# Patient Record
Sex: MALE | Race: WHITE
[De-identification: names, ages, dates, MRNs, and addresses within clinical notes are randomized per-mention and may not be internally consistent; named-entity substitution may affect disease eponyms.]

---

## 2017-09-10 ENCOUNTER — HOSPITAL ENCOUNTER (OUTPATIENT)
Dept: HOSPITAL 25 - ED | Age: 77
Setting detail: OBSERVATION
LOS: 1 days | Discharge: HOME | End: 2017-09-11
Attending: INTERNAL MEDICINE | Admitting: HOSPITALIST
Payer: MEDICARE

## 2017-09-10 DIAGNOSIS — K40.90: ICD-10-CM

## 2017-09-10 DIAGNOSIS — K80.20: ICD-10-CM

## 2017-09-10 DIAGNOSIS — I31.3: ICD-10-CM

## 2017-09-10 DIAGNOSIS — R07.89: Primary | ICD-10-CM

## 2017-09-10 DIAGNOSIS — E78.5: ICD-10-CM

## 2017-09-10 DIAGNOSIS — R06.02: ICD-10-CM

## 2017-09-10 DIAGNOSIS — I48.2: ICD-10-CM

## 2017-09-10 DIAGNOSIS — I34.0: ICD-10-CM

## 2017-09-10 DIAGNOSIS — I27.2: ICD-10-CM

## 2017-09-10 DIAGNOSIS — Z79.01: ICD-10-CM

## 2017-09-10 DIAGNOSIS — I25.2: ICD-10-CM

## 2017-09-10 DIAGNOSIS — I35.0: ICD-10-CM

## 2017-09-10 DIAGNOSIS — R00.1: ICD-10-CM

## 2017-09-10 DIAGNOSIS — I10: ICD-10-CM

## 2017-09-10 LAB
HCT VFR BLD AUTO: 36 % (ref 42–52)
HGB BLD-MCNC: 12.2 G/DL (ref 14–18)
MCH RBC QN AUTO: 30 PG (ref 27–31)
MCHC RBC AUTO-ENTMCNC: 34 G/DL (ref 31–36)
MCV RBC AUTO: 88 FL (ref 80–94)
RBC # BLD AUTO: 4.03 10^6/UL (ref 4–5.4)
WBC # BLD AUTO: 7.2 10^3/UL (ref 3.5–10.8)

## 2017-09-10 PROCEDURE — G0378 HOSPITAL OBSERVATION PER HR: HCPCS

## 2017-09-10 PROCEDURE — 71275 CT ANGIOGRAPHY CHEST: CPT

## 2017-09-10 PROCEDURE — 93306 TTE W/DOPPLER COMPLETE: CPT

## 2017-09-10 PROCEDURE — 85025 COMPLETE CBC W/AUTO DIFF WBC: CPT

## 2017-09-10 PROCEDURE — 86140 C-REACTIVE PROTEIN: CPT

## 2017-09-10 PROCEDURE — 85652 RBC SED RATE AUTOMATED: CPT

## 2017-09-10 PROCEDURE — 83605 ASSAY OF LACTIC ACID: CPT

## 2017-09-10 PROCEDURE — 78452 HT MUSCLE IMAGE SPECT MULT: CPT

## 2017-09-10 PROCEDURE — 99285 EMERGENCY DEPT VISIT HI MDM: CPT

## 2017-09-10 PROCEDURE — 71010: CPT

## 2017-09-10 PROCEDURE — 93017 CV STRESS TEST TRACING ONLY: CPT

## 2017-09-10 PROCEDURE — 84484 ASSAY OF TROPONIN QUANT: CPT

## 2017-09-10 PROCEDURE — 74174 CTA ABD&PLVS W/CONTRAST: CPT

## 2017-09-10 PROCEDURE — 87040 BLOOD CULTURE FOR BACTERIA: CPT

## 2017-09-10 PROCEDURE — 36415 COLL VENOUS BLD VENIPUNCTURE: CPT

## 2017-09-10 PROCEDURE — 85610 PROTHROMBIN TIME: CPT

## 2017-09-10 PROCEDURE — 80053 COMPREHEN METABOLIC PANEL: CPT

## 2017-09-10 PROCEDURE — A9502 TC99M TETROFOSMIN: HCPCS

## 2017-09-10 PROCEDURE — 93005 ELECTROCARDIOGRAM TRACING: CPT

## 2017-09-11 VITALS — DIASTOLIC BLOOD PRESSURE: 59 MMHG | SYSTOLIC BLOOD PRESSURE: 142 MMHG

## 2017-09-11 LAB
ALBUMIN SERPL BCG-MCNC: 4.1 G/DL (ref 3.2–5.2)
ALP SERPL-CCNC: 52 U/L (ref 34–104)
ALT SERPL W P-5'-P-CCNC: 33 U/L (ref 7–52)
ANION GAP SERPL CALC-SCNC: 5 MMOL/L (ref 2–11)
AST SERPL-CCNC: 44 U/L (ref 13–39)
BUN SERPL-MCNC: 17 MG/DL (ref 6–24)
BUN/CREAT SERPL: 16 (ref 8–20)
CALCIUM SERPL-MCNC: 9.1 MG/DL (ref 8.6–10.3)
CHLORIDE SERPL-SCNC: 104 MMOL/L (ref 101–111)
GLOBULIN SER CALC-MCNC: 3.1 G/DL (ref 2–4)
GLUCOSE SERPL-MCNC: 108 MG/DL (ref 70–100)
HCO3 SERPL-SCNC: 27 MMOL/L (ref 22–32)
POTASSIUM SERPL-SCNC: 4.1 MMOL/L (ref 3.5–5)
PROT SERPL-MCNC: 7.2 G/DL (ref 6.4–8.9)
SODIUM SERPL-SCNC: 136 MMOL/L (ref 133–145)
TROPONIN I SERPL-MCNC: 0.01 NG/ML (ref ?–0.04)
TROPONIN I SERPL-MCNC: 0.01 NG/ML (ref ?–0.04)

## 2017-09-11 NOTE — HP
H&P (Free Text)


History and Physical: 





PCP: PAMELLA Dueñas MD


Cardiology: JAMES Guerrero MD





Date/Time of Evaluation: 09/11/2017 0200





CC: chest pain 





HPI: Mr Yanes is a 76YO male HX CAD/MI/stent, pAFIB, mod/sev AS, HTN, & HLD 

presenting with onset of continuous non-exertional chest pressure like a "small 

elephant" a day and a half ago radiating to the back of the neck and across the 

shoulders posteriorly and associated with SOB & sweats, but no palpitations, 

nausea, or light-headedness. He denies F/C, cough, chest congestion, or other 

acute issues. His last stress test was ~1 year ago and he relates Dr Guerrero 

planning on re-testing this fall. His last ECHO was ~1 month ago at [facility].





PMedHx


CAD/MI/stent


paroxysmal AFIB


pericardial effusion


mod/sev AS


HTN


HLD


urolithiasis





Ambulatory Orders


Aspirin EC Low Dose* [Ecotrin EC Low Dose 81 MG*] 81 mg PO DAILY 02/02/13 


NIFEdipine ER TAB* [Procardia Xl TAB*] 30 mg PO DAILY 02/02/13 


Rosuvastatin Calcium [Crestor] 40 mg PO DAILY 02/02/13 


B-12 500 mcg PO DAILY 03/02/17 


Benazepril HCl [Lotensin] 10 mg PO DAILY 03/02/17 


Carvedilol TAB* [Coreg TAB*] 6.25 mg PO BID #60  03/02/17 


Ezetimibe TAB* [Zetia TAB*] 10 mg PO 1700 03/02/17 


Potassium Chlor TAB* [Klor Con ER TAB 10 MEQ*] 10 meq PO DAILY 03/02/17 


Warfarin TAB(*) [Coumadin TAB(*)] 2.5 mg PO 1700 03/02/17 





Allergies


Cephalexin Allergy (Verified 09/11/17 02:15)


 Rash And Itching


PSurgHx


L inguinal hernia repair


cardioversion x3


cardiac stent x2


tonsillectomy





SocHx: no tobacco, alcohol, or recreational drugs; , lives alone; full 

code status





FamHx: Mother passed of ovarian CA. Father: estranged; Brother: CAD/stent, 

passed of ESRD; Brother: DM2





ROS: as above, additionally reports chronic nasal congestion; otherwise 

reviewed and all were negative





Constitutional: NAD, normally developed, well-nourished white male


 


vitals: 


 Vital Signs











Temp  36.6 C   09/10/17 21:52


 


Pulse  51   09/11/17 01:53


 


Resp  18   09/11/17 01:53


 


BP  148/71   09/11/17 01:53


 


Pulse Ox  96   09/11/17 01:53








 Intake & Output











 09/10/17 09/10/17 09/11/17





 11:59 23:59 11:59


 


Weight  74.843 kg 74.843 kg








HEENM: atraumatic; sclera/conjunctiva: non-icteric/clear; hearing: clinically 

intact; oropharynx: clear, mucosa moist





Neck: soft tissue: non-tender; thyroid: normal





Pulmonary: clear to auscultation bilaterally, good aeration, no accessory 

muscle use 





CV: RR/RR, normal S1S2, no carotid bruit, no jugular venous distention, 2+ B DP/

PT, no edema





Abdominal: soft, non-distended, non-tender, no rebound/guarding/rigidity, 

normoactive bowel sounds, no hepatosplenomegaly or masses, no costovertebral 

angle tenderness





Musculoskeletal: general: grossly intact without palpable tenderness





Integumental: normal appearance and texture of exposed skin





Psychiatric 


orientation: AA&O to PPS


affect: calm


mood: cooperative


eye contact: good


content: reliable


responses: timely


insight: fair





Testing: 


 Lab Results











  09/10/17 09/10/17 09/10/17 Range/Units





  22:05 22:05 22:05 


 


WBC  7.2    (3.5-10.8)  10^3/ul


 


RBC  4.03    (4.0-5.4)  10^6/ul


 


Hgb  12.2 L    (14.0-18.0)  g/dl


 


Hct  36 L    (42-52)  %


 


MCV  88    (80-94)  fL


 


MCH  30    (27-31)  pg


 


MCHC  34    (31-36)  g/dl


 


RDW  14    (10.5-15)  %


 


Plt Count  222    (150-450)  10^3/ul


 


MPV  9    (7.4-10.4)  um3


 


Neut % (Auto)  63.6    (38-83)  %


 


Lymph % (Auto)  20.5 L    (25-47)  %


 


Mono % (Auto)  9.6 H    (1-9)  %


 


Eos % (Auto)  5.3    (0-6)  %


 


Baso % (Auto)  1.0    (0-2)  %


 


Absolute Neuts (auto)  4.6    (1.5-7.7)  10^3/ul


 


Absolute Lymphs (auto)  1.5    (1.0-4.8)  10^3/ul


 


Absolute Monos (auto)  0.7    (0-0.8)  10^3/ul


 


Absolute Eos (auto)  0.4    (0-0.6)  10^3/ul


 


Absolute Basos (auto)  0.1    (0-0.2)  10^3/ul


 


Absolute Nucleated RBC  0    10^3/ul


 


Nucleated RBC %  0    


 


Sodium   136   (133-145)  mmol/L


 


Potassium   4.1   (3.5-5.0)  mmol/L


 


Chloride   104   (101-111)  mmol/L


 


Carbon Dioxide   27   (22-32)  mmol/L


 


Anion Gap   5   (2-11)  mmol/L


 


BUN   17   (6-24)  mg/dL


 


Creatinine   1.06   (0.67-1.17)  mg/dL


 


Est GFR ( Amer)   87.4   (>60)  


 


Est GFR (Non-Af Amer)   67.9   (>60)  


 


BUN/Creatinine Ratio   16.0   (8-20)  


 


Glucose   108 H   ()  mg/dL


 


Lactic Acid    0.8  (0.5-2.0)  mmol/L


 


Calcium   9.1   (8.6-10.3)  mg/dL


 


Total Bilirubin   1.30 H   (0.2-1.0)  mg/dL


 


AST   44 H   (13-39)  U/L


 


ALT   33   (7-52)  U/L


 


Alkaline Phosphatase   52   ()  U/L


 


Troponin I   0.01   (<0.04)  ng/mL


 


Total Protein   7.2   (6.4-8.9)  g/dL


 


Albumin   4.1   (3.2-5.2)  g/dL


 


Globulin   3.1   (2-4)  g/dL


 


Albumin/Globulin Ratio   1.3   (1-3)  














  09/11/17 Range/Units





  02:05 


 


WBC   (3.5-10.8)  10^3/ul


 


RBC   (4.0-5.4)  10^6/ul


 


Hgb   (14.0-18.0)  g/dl


 


Hct   (42-52)  %


 


MCV   (80-94)  fL


 


MCH   (27-31)  pg


 


MCHC   (31-36)  g/dl


 


RDW   (10.5-15)  %


 


Plt Count   (150-450)  10^3/ul


 


MPV   (7.4-10.4)  um3


 


Neut % (Auto)   (38-83)  %


 


Lymph % (Auto)   (25-47)  %


 


Mono % (Auto)   (1-9)  %


 


Eos % (Auto)   (0-6)  %


 


Baso % (Auto)   (0-2)  %


 


Absolute Neuts (auto)   (1.5-7.7)  10^3/ul


 


Absolute Lymphs (auto)   (1.0-4.8)  10^3/ul


 


Absolute Monos (auto)   (0-0.8)  10^3/ul


 


Absolute Eos (auto)   (0-0.6)  10^3/ul


 


Absolute Basos (auto)   (0-0.2)  10^3/ul


 


Absolute Nucleated RBC   10^3/ul


 


Nucleated RBC %   


 


Sodium   (133-145)  mmol/L


 


Potassium   (3.5-5.0)  mmol/L


 


Chloride   (101-111)  mmol/L


 


Carbon Dioxide   (22-32)  mmol/L


 


Anion Gap   (2-11)  mmol/L


 


BUN   (6-24)  mg/dL


 


Creatinine   (0.67-1.17)  mg/dL


 


Est GFR ( Amer)   (>60)  


 


Est GFR (Non-Af Amer)   (>60)  


 


BUN/Creatinine Ratio   (8-20)  


 


Glucose   ()  mg/dL


 


Lactic Acid   (0.5-2.0)  mmol/L


 


Calcium   (8.6-10.3)  mg/dL


 


Total Bilirubin   (0.2-1.0)  mg/dL


 


AST   (13-39)  U/L


 


ALT   (7-52)  U/L


 


Alkaline Phosphatase   ()  U/L


 


Troponin I  0.01  (<0.04)  ng/mL


 


Total Protein   (6.4-8.9)  g/dL


 


Albumin   (3.2-5.2)  g/dL


 


Globulin   (2-4)  g/dL


 


Albumin/Globulin Ratio   (1-3)  








ECG, personally reviewed: bradycardic AFIB rate 51, no ischemia, Q-waves II/III/

AVF





CXR, personally reviewed: no acute process





Impression: 77M HX CAD/MI/stent, mod/sev AS, HTN, HLD presents with chest pain 

for r/o ACS





DIAGNOSIS & PLAN


Primary 


chest pain r/o ACS 


: HX CAD/MI/stent


: telemetry


: trend troponin


: aspirin


: hold carvedilol for stress testing in AM


: chemical NST in AM


: supplemental oxygen


: supportive care





Secondary 


paroxysmal AFIB, current in rate controlled AFIB


: review meds once reconciled





HX pericardial effusion





HX mod/sev AS





HTN


: review meds once reconciled





HLD


: review meds once reconciled





urolithiasis


: no acute issues





Admission Rational: CDU observation for r/o ACS


DVTp: continue warfarin


Code Status: full


HCP: children

## 2017-09-11 NOTE — RAD
INDICATION:  Chest pain.



COMPARISON:  Comparison is made with a prior study from April 05, 2015.



TECHNIQUE: A portable view of the chest was obtained.



FINDINGS: The heart is moderately enlarged and slightly increased in size from the prior

exam. The lungs are clear. No pleural effusion is seen.



IMPRESSION:  CARDIOMEGALY SLIGHTLY INCREASED IN SIZE.

## 2017-09-11 NOTE — ECHO
Patient:      ABE OCASIO

Med Rec#:     [id number]            :          1940          

Date:         2017            Age:          77y                 

Account#:     [id number]          Height:       172.72 cm / 68.0 in

Accession#:   [id number]           Weight:       76.2 kg / 167.9 lbs

Sex:          M                     BSA:          1.9

Room#:        453                   

Admit Date#:  2017          

Type:         Inpatient

 

Referring:    Eusebio Meza

Reading:      Gaston Schaeffer MD

Sonographer:  Lisa Wellington SHON

CC:           Nelly Dueñas MD

CC:           Carey Guerrero MD

______________________________________________________________________

 

Transthoracic Echocardiogram

 

Indication:

Pericardial Effusion

BP:           138/63

HR:           48

Rhythm:       A-Fib

 

Findings     

History:

CAD,MI,PCI,PAF,AS,HTN,HLD,prior known pericardial effusion. 

 

Technical Comments:

The study quality is good.   Completed at 1538. 

 

Left Ventricle:

The left ventricular chamber size is normal. Septal wall hypertrophy is

observed. Global left ventricular wall motion and contractility are

within normal limits. There is normal left ventricular systolic

function. The estimated ejection fraction is 55-60%.  The assessment of

diastolic function is non-diagnostic. 

 

Left Atrium:

The left atrium is moderately dilated. 

 

Right Ventricle:

Moderator Band present. The right ventricle is mildly dilated.  The

right ventricular global systolic function is normal. 

 

Right Atrium:

The right atrium is moderate to severely dilated.  

 

Aortic Valve:

The aortic valve is trileaflet. The aortic valve leaflets are moderately

thickened. Systolic excursion of the aortic valve cusps is reduced.

There is no evidence of aortic regurgitation. There is moderate aortic

stenosis. The highest aortic valve velocity was obtained with the

standard probe from the A5C view. 

 

Mitral Valve:

There is mitral annular calcification. The mitral valve leaflets are

moderately thickened. There is mild to moderate mitral regurgitation. 

There is borderline mitral stenosis. 

 

Tricuspid Valve:

The tricuspid valve leaflets are normal.  There is mild tricuspid

regurgitation. There is evidence of moderate pulmonary hypertension.

There is no tricuspid stenosis. 

 

Pulmonic Valve:

The pulmonic valve appears normal. There is mild to moderate pulmonic

regurgitation.  There is no pulmonic stenosis.  

 

Pericardium:

There is a small pericardial effusion. There are no signs of significant

hemodynamic compromise. 

 

Aorta:

There is no dilatation of the ascending aorta. There is no dilatation of

the aortic arch. There is no dilation of the aortic root. 

 

Pulmonary Artery:

The main pulmonary artery appears normal. 

 

Venous:

The inferior vena cava is dilated.  There is a greater than 50%

respiratory change in the inferior vena cava dimension. 

 

Summary:

There are no significant changes when compared to the previous study

done on 3/7/17 

 

Conclusions

Global left ventricular wall motion and contractility are within normal

limits.

There is normal left ventricular systolic function.

The estimated ejection fraction is 55-60%. 

The right ventricular global systolic function is normal.

The right atrium is moderate to severely dilated. 

The aortic valve leaflets are moderately thickened.

There is moderate aortic stenosis.

There is no evidence of aortic regurgitation.

There is mild to moderate mitral regurgitation. 

There is mild tricuspid regurgitation.

There is evidence of moderate pulmonary hypertension.

There is a small pericardial effusion.

There are no signs of significant hemodynamic compromise.

There are no significant changes when compared to the previous study

done on 3/7/17

 

Measurements     

Name                    Value         Normal Range            

RVIDd (AP) 2D           3.3 cm        (0.9 - 2.6)             

RVDdMajor (2D)          4.8 cm        (2.2 - 4.4)             

RAd ISD 4CH             7.1 cm        (3.4 - 4.9)             

RA (A4C)W               5.2 cm        (2.9 - 4.6)             

IVSd (2D)               1.1 cm        (0.6 - 1)               

LVPWd (2D)              0.9 cm        (0.6 - 1)               

LVIDd (2D)              4.9 cm        (3.6 - 5.4)             

LVIDs (2D)              3.9 cm        -                        

LV FS (2D)              21 %          (25 - 45)               

Aortic Annulus          1.9 cm        (1.4 - 2.6)             

Ao root diameter (2D)   3.5 cm        (2.1 - 3.5)             

Ascending Ao            3.1 cm        (2.1 - 3.4)             

Aortic arch             2.9 cm        (1.8 - 3.4)             

Descending Ao           0.4 cm        -                        

LA dimension (AP) 2D    4.6 cm        (2.3 - 3.8)             

LAd ISD 4CH             6.4 cm        (2.9 - 5.3)             

LA ISD 4CH W            5.9 cm        (2.5 - 4.5)             

 

Name                    Value         Normal Range            

LA ESV SP 4CH (A/L)     131 ml        -                        

LA ESV SP 2CH (A/L)     85 ml         -                        

LA ESV BP (A/L)         106 ml        -                        

LA ESV BP (A/L) index   55.85 ml/m2   -                        

LA ESV SP 4CH (MOD)     116 ml        -                        

LA ESV SP 2CH (MOD)     79 ml         -                        

 

Name                    Value         Normal Range            

MV E-wave Vmax          1.5 m/sec     -                        

MV deceleration time    277 msec      -                        

LV septal e' Vmax       0.06 m/sec    -                        

LV lateral e' Vmax      0.09 m/sec    -                        

LV E:e' septal ratio    25 ratio      -                        

LV E:e' lateral ratio   16.67 ratio   -                        

 

Name                    Value         Normal Range            

AV Vmax                 2.8 m/sec     -                        

AV VTI                  65.8 cm       -                        

AV peak gradient        30.9 mmHg     -                        

AV mean gradient        15.17 mmHg    -                        

LVOT diameter           2 cm          -                        

LVOT Vmax               1 m/sec       -                        

LVOT VTI                24 cm         -                        

LVOT peak gradient      3.98 mmHg     -                        

LVOT mean gradient      1.55 mmHg     -                        

KILEY (continuity Vmax)   1.1 cm2       -                        

KILEY (continuity VTI)    1.2 cm2       -                        

 

Name                    Value         Normal Range            

MV Vmax                 1.5 m/sec     -                        

MV VTI                  35.2 cm       -                        

MV peak gradient        8.72 mmHg     -                        

MV mean gradient        2.57 mmHg     -                        

MV PHT                  73 msec       -                        

MR Vmax                 5.2 m/sec     -                        

MR VTI                  186 cm        -                        

MVA (PHT)               3 cm2         -                        

MVA (continuity VTI)    2.2 cm2       -                        

 

Name                    Value         Normal Range            

TR Vmax                 3 m/sec       -                        

TR peak gradient        36 mmHg       -                        

RAP                     8 mmHg        -                        

RVSP                    44 mmHg       -                        

IVC diameter            2.4 cm        -                        

 

Name                    Value         Normal Range            

PV Vmax                 0.7 m/sec     -                        

PV peak gradient        2.19 mmHg     -                        

 

Electronically signed by: Gaston Schaeffer MD on 2017 16:20:17

## 2017-09-11 NOTE — RAD
Edited for charges.



INDICATION: Chest pressure, shortness of breath, nausea. Previous myocardial 
infarction.

Previous stents.



COMPARISON: No relevant prior exams available [place] [facility] [place].



TECHNIQUE: 10.810 mCi of Tc-99m Myoview were administered IV. SPECT images of 
the heart

were obtained.



Later on the same day. Under the direction of Dr. Alexandra, the patient was given 
an IV

injection of a pharmacologic stress agent. Subsequently, the patient was given 
an IV

injection of 25.360 mCi Tc-99m Myoview. SPECT images of the heart were obtained 
and a

gated wall motion study was performed.



No CT for attenuation correction due to limitation in range of motion of the 
arms.



FINDINGS:  Gated wall motion images were obtained at stress and demonstrate 
septal and

less marked inferior wall hypokinesia.



The calculated left ventricular ejection fraction is 62 % at stress. Estimated 
LEFT

ventricular end diastolic volume is 125 mL. TID 1.07.   



Diaphragmatic attenuation noted limiting assessment of the inferior wall. 
Presence of a

fixed perfusion defect at the inferior wall is not excluded. No stress-induced 
reversible

perfusion defect evident to indicate ischemia.



IMPRESSION: 

1. Septal and less marked inferior wall hypokinesia. Nonetheless LEFT 
ventricular ejection

fraction remains within normal range.

2. Borderline dilated LEFT ventricle with estimated end-diastolic volume of 125 
mL.

3. Grossly fixed inferior wall defect may represent diaphragmatic attenuation 
or infarct.

4. No stress-induced ischemia evident.



ASSESSMENT:  LOW RISK.



Based on imaging criteria from ACC/AHA 2002 Guideline Update for the Management 
of

Patients With Chronic Stable Angina Table 23. Noninvasive Risk Stratification.



MTDD

## 2017-09-11 NOTE — RAD
INDICATION:  Chest pain with radiation into back, dissection protocol.



COMPARISON:  Comparison is made with a prior CT of the abdomen and pelvis from August 13, 2012 and a prior chest x-ray study from September 10, 2017.



TECHNIQUE: A CT angiogram of the chest, abdomen and pelvis was performed with intravenous

contrast following intravenous injection of 100 ml of  Omnipaque 350 nonionic contrast.

Contiguous axial sections were obtained from the lung apices through the symphysis pubis.

Images were reconstructed in the coronal and sagittal planes and in a 3-D volume rendered

reformatted.



FINDINGS: 



CT ANGIOGRAM OF THE CHEST:



There is relatively homogeneous opacification of the pulmonary arteries. No intraluminal

filling defect or pulmonary embolism is seen.



The heart appears moderately enlarged. There is a moderate sized pericardial effusion

present.  There are coronary artery calcifications present. The thoracic aorta is normal

in caliber. The ascending aorta measures a maximum of 3.8 cm in transverse dimension. The

aorta demonstrates homogeneous contrast opacification. No aneurysm or dissection is seen.

There is moderate atherosclerotic change.



No significant enlarged mediastinal or hilar lymph nodes are seen.



There are small dependent bilateral lower lobe infiltrates most consistent with

subsegmental atelectasis. The lungs are otherwise clear. No pleural effusion or

pneumothorax is present.



CT ANGIOGRAM OF THE ABDOMEN AND PELVIS:



The abdominal aorta is normal in caliber and demonstrates homogeneous contrast

opacification. No aneurysm or dissection is present. There is moderate atherosclerotic

change.  



The celiac axis, superior and inferior mesenteric arteries appear patent without evidence

for hemodynamically significant stenosis.



There are 2 right renal arteries and a single left renal artery. There is mild to moderate

atherosclerotic change. No hemodynamically significant stenosis is seen.



The liver and spleen are normal in size without significant focal abnormality. There are

calcified gallstones present. No gallbladder wall thickening or pericholecystic fluid is

seen. The pancreas is normal in size. No ductal distention or calcifications are seen. 



The kidneys and adrenal glands are normal in size. No hydronephrosis is seen.  There is a

3.6 x 3.8 cm cyst arising from the lower pole of the right kidney. The prostate gland is

enlarged measuring 6.0 cm in transverse dimension.



No significant enlarged retroperitoneal lymph nodes are seen.



There is a small hiatal hernia. The stomach, small and large bowel appear nondistended.

There are scattered diverticuli within colon. There is no evidence for diverticulitis or

colitis. There is a small periumbilical hernia containing fat. There is a left inguinal

hernia containing a portion of the sigmoid colon.



No free intraperitoneal air or fluid is seen. No free intraperitoneal air or fluid is

seen.



No significant focal osseous abnormality is seen.



IMPRESSION:  

1. NO EVIDENCE FOR AORTIC DISSECTION.

2. MODERATE SIZE PERICARDIAL EFFUSION.

3. CHOLELITHIASIS.

4. LEFT INGUINAL HERNIA CONTAINING A PORTION OF THE SIGMOID COLON WHICH IS NONDISTENDED.

## 2017-09-12 NOTE — DS
CC:  Dr. Nelly Dueñas; Dr. Guerrero; Dr. Sanchez *

 

DISCHARGE SUMMARY:

 

DATE OF ADMISSION:  17

 

DATE OF DISCHARGE:  17

 

PRIMARY CARE PROVIDER:  Dr. Nelly Dueñas.

 

PRIMARY CARDIOLOGIST:  Dr. Carey Guerrero.

 

CONSULTING ELECTROPHYSIOLOGIST:  Pending appointment, Dr. Sanchez in 
[facility].

 

DISCHARGING PROVIDER:  BOYD Hall

 

SUPERVISING PHYSICIAN:  Dr. Kapil Acevedo * (DICTATED BY BOYD HALL)

 

PRIMARY DISCHARGE DIAGNOSES:

1.  Chest pain - normal stress testing and unchanged echocardiogram, CTA 
negative. Chest pain seems to be noncardiac in origin and now resolved.

2.  Pericardial effusion - this is small and unchanged when compared to March 
of this year with normal CRP and sed rate.

3.  Bradycardia - heart rate dips into the high 30s, low 40s while sleeping but 
asymptomatic and heart rate responds appropriately to state of wakefulness and 
activity.

 

SECONDARY DISCHARGE DIAGNOSES:

1.  Atrial fibrillation - anticoagulated on Coumadin.

2.  Coronary artery disease with history of prior myocardial infarction.

3.  Moderate aortic stenosis.

4.  Hypertension.

5.  Hyperlipidemia.

 

DISCHARGE MEDICATIONS:

1.  Aspirin 81 mg p.o. daily.

2.  Benazepril 10 mg p.o. daily.

3.  Carvedilol 6.25 mg p.o. twice daily.

4.  Zetia 10 mg p.o. daily.

5.  Nifedipine 30 mg p.o. daily.

6.  Potassium chloride 10 mEq p.o. daily.

7.  Crestor 40 mg p.o. daily.

8.  Coumadin 2.5 mg p.o. daily.

 

HOSPITAL IMAGIN.  Chest x-ray shows cardiomegaly, slightly increased in size.  Lungs are 
clear.

2.  CTA of the chest, abdomen, and pelvis shows no evidence of dissection.  
There is a moderate-sized pericardial effusion with cholelithiasis and a left 
inguinal hernia containing a portion of the sigmoid colon, which is 
nondistended.

3.  Nuclear stress test is read as a low-risk study with septal and left marked 
inferior wall hypokinesia but overall normal ejection fraction estimated or 
measured at 62%.  Borderline dilated left ventricle with estimated end-
diastolic volume of 125 mL with grossly fixed inferior wall defect, which may 
represent diaphragmatic attenuation or infarct.  No evidence of stress-induced 
ischemia.

4.  Transthoracic echocardiogram shows a normal-appearing left ventricle with 
EF estimated at 55% to 60% with moderate aortic stenosis, mild-to-moderate 
mitral regurg, moderate pulmonary hypertension, and a small pericardial 
effusion.  All findings appear unchanged when compared to March of this year.

5.  EKG demonstrates atrial fibrillation with a rate of about 50 beats per 
minute. No acute ischemic changes, Q waves in leads II, III, and aVF.

 

HOSPITAL COURSE:  This is a very pleasant 77-year-old gentleman with a history 
of prior acute MI as well as moderate aortic stenosis, hypertension, 
hyperlipidemia, and atrial fibrillation for which he is chronically 
anticoagulated, who presented to the emergency department with complaints of 
chest pain.  He stated that the chest pain had been nearly constant for the day 
and a half prior to admission and he described the sensation of feeling as if a 
small elephant was sitting up on his chest.  He was evaluated in the emergency 
department, which demonstrated initial CBC was within normal limits.  
Comprehensive metabolic panel was unremarkable. Initial troponin negative at 
0.01.  Initial EKG did not appear to be ischemic.  He had atrial fibrillation 
with a rate of about 50 beats per minute and T waves representative of old 
infarct but nothing acute.  He underwent CTA of the chest, abdomen, and pelvis, 
which showed no evidence of dissection or pulmonary embolus but did recognize a 
moderate size pericardial effusion.  The patient was subsequently admitted to 
telemetry floor, maintained on continuous monitoring, which demonstrated 
significant bradycardia overnight while he was sleeping with heart rate dipping 
into the upper 30s and low 40s.  The patient remained normotensive and was 
asymptomatic when awake.  His heart rate would jump into the mid 50s and low 
60s during the times that he was awake and would respond appropriately to 
activity.

 

Serial troponin measurement remains negative and he underwent nuclear stress 
testing, which showed evidence of old infarct consistent with his history of MI 
in 2004, but no evidence of ischemic changes.  The patient had an 
echocardiogram completed, which was compared to 2017.  He has moderate 
aortic stenosis, normal LV function, and a small pericardial effusion without 
evidence of tamponade. Sed rate and CRP were checked, both of which were within 
normal limits.  No ST- segment changes and the patient's chest pain resolved 
spontaneously making pericarditis quite unlikely.

 

In regards to the patient's profound bradycardia, the patient is on carvedilol 
and attempts were made to contact his primary cardiologist, who is out of the 
office today.  Discussed case briefly with on-call cardiologist, Dr. Schaeffer, who 
suggested that as long as he is asymptomatic and not experience such 
bradycardia while he is awake that there is no need to  at 
this time.

 

The patient remained asymptomatic throughout his hospital stay.  He offered no 
additional complaints.

 

DISPOSITION AND FOLLOWUP PLAN:  The patient is being discharged to home.  He 
has an appointment with an electrophysiologist later this week to consider an 
ablation procedure for his atrial fibrillation.  Encouraged him to keep that 
appointment as scheduled and review his bradycardia at that time as well and 
follow up with his primary cardiologist, Dr. Guerrero.  No changes were made to 
his home medications.

 

____________________________________ BOYD HALL

 

[id number]/[facility] #: [id number]

KATINA

## 2017-09-14 NOTE — ED
Marilia MAE Thomas, scribed for Daryl Edward MD on 09/10/17 at 2222 .





HPI Chest Pain





- HPI Summary


HPI Summary: 





The pt is a 76 y/o M accompanied by two family members presenting to the ED c/o 

constant upper sternal CP that began yesterday afternoon. The pain radiates to 

his neck and shoulder blades. The pt rates the pain 4/10. The pain is 

aggravated by nothing and is alleviated by relaxation. The patient has treated 

the pain with nothing PTA. Pt additionally c/o mild SOB and a cough. He has a 

PMHx of A-Fib and he has been cardioverted on three separate times. In three 

days, he has an appointment with an electrophysiologist in [place] for an 

ablation. He is on warfarin, a beta blocker, ASA 81, and other medications.  

PMHx: A-Fib, CAD, BPH. PSHx: two cardiac stents (). SHx: no smoking, no 

drinking, no illicit drugs. FHx: negative for aneurysms. His wife  in  

from CHF. His cardiologist is Dr. Guerrero. He was told by Dr. Guerrero not to 

take NTG because it would have an adverse effect on me. 





- History of Current Complaint


Chief Complaint: EDChestPainROMI


Time Seen by Provider: 09/10/17 22:21


Hx Obtained From: Patient, Family/Caretaker - son and family member are in the 

room


Onset/Duration: Started Days Ago - onset yesterday afternoon, Still Present


Timing: Constant


Current Severity: Moderate


Pain Intensity: 4


Pain Scale Used: 0-10 Numeric


Chest Pain Location: Upper Sternal


Chest Pain Radiates: Yes


Chest Pain Radiates To:: Shoulder, Neck


Aggravating Factor(s): Nothing


Alleviating Factor(s): Rest


Associated Signs and Symptoms: Positive: Chest Pain, Shortness of Breath - mild

, Cough





- Allergy/Home Medications


Allergies/Adverse Reactions: 


 Allergies











Allergy/AdvReac Type Severity Reaction Status Date / Time


 


Cephalexin Allergy  Rash And Verified 17 02:15





   Itching  














PMH/Surg Hx/FS Hx/Imm Hx


Previously Healthy: No


Endocrine/Hematology History: 


   Denies: Hx Diabetes, Hx Thyroid Disease


Cardiovascular History: Reports: Hx Atrial Fibrillation, Hx Congenital Heart 

Disease, Hx Hypertension


Respiratory History: 


   Denies: Hx Asthma, Hx Chronic Obstructive Pulmonary Disease (COPD)


GI History: 


   Denies: Hx Ulcer


 History: Reports: Hx Benign Prostatic Hyperplasia





- Surgical History


Surgery Procedure, Year, and Place: 2 cardiac stents .  hernia repair 


Infectious Disease History: No


Infectious Disease History: 


   Denies: Hx Hepatitis, Hx Human Immunodeficiency Virus (HIV), Traveled 

Outside the [place] in Last 30 Days





- Family History


Known Family History: 


   Negative: Other - NEG: aneurysms





- Social History


Alcohol Use: None


Substance Use Type: Reports: None


Smoking Status (MU): Never Smoked Tobacco





Review of Systems


Negative: Fever, Chills


Negative: Erythema - eyes


Negative: Sore Throat


Positive: Chest Pain - constant upper sternal with onset yesterday


Positive: Shortness Of Breath - mild, Cough


Negative: Abdominal Pain, Vomiting, Nausea


Negative: dysuria, hematuria


Negative: Myalgia, Edema - leg


Negative: Rash


Neurological: Other - NEG: dizziness


All Other Systems Reviewed And Are Negative: Yes





Physical Exam





- Summary


Physical Exam Summary: 





Constitutional: Well-developed, Well-nourished, Alert. (-) Distressed


Skin: Warm, Dry


HENT: Normocephalic; Atraumatic 


Eyes: Conjunctiva normal


Neck: Musculoskeletal ROM normal neck. (-) JVD, (-) Stridor, (-) Tracheal 

deviation


Cardio: Rhythm regular, rate normal, Heart sounds normal; Intact distal pulses; 

The pedal pulses are 2+ and symmetric. Radial pulses are 2+ and symmetric. (-) 

Murmur


Pulmonary/Chest wall: Effort normal. (-) Respiratory distress, (-) Wheezes, (-) 

Rales


Abd: Soft, (-) Tenderness, (-) Distension, (-) Guarding, (-) Rebound


Musculoskeletal: (-) Edema


Lymph: (-) Cervical adenopathy


Neuro: Alert, Oriented x3


Psych: Mood and affect Normal


Triage Information Reviewed: Yes


Vital Signs On Initial Exam: 


 Initial Vitals











Temp Pulse Resp BP Pulse Ox


 


 98 F   57   16   154/74   99 


 


 09/10/17 21:52  09/10/17 21:52  09/10/17 21:52  09/10/17 21:52  09/10/17 21:52











Vital Signs Reviewed: Yes





- Lakesha Coma Scale


Coma Scale Total: 15





Diagnostics





- Vital Signs


 Vital Signs











  Temp Pulse Resp BP Pulse Ox


 


 09/10/17 21:52  98 F  57  16  154/74  99














- Laboratory


Lab Results: 


 Lab Results











  09/10/17 09/10/17 09/10/17 Range/Units





  22:05 22:05 22:05 


 


WBC  7.2    (3.5-10.8)  10^3/ul


 


RBC  4.03    (4.0-5.4)  10^6/ul


 


Hgb  12.2 L    (14.0-18.0)  g/dl


 


Hct  36 L    (42-52)  %


 


MCV  88    (80-94)  fL


 


MCH  30    (27-31)  pg


 


MCHC  34    (31-36)  g/dl


 


RDW  14    (10.5-15)  %


 


Plt Count  222    (150-450)  10^3/ul


 


MPV  9    (7.4-10.4)  um3


 


Neut % (Auto)  63.6    (38-83)  %


 


Lymph % (Auto)  20.5 L    (25-47)  %


 


Mono % (Auto)  9.6 H    (1-9)  %


 


Eos % (Auto)  5.3    (0-6)  %


 


Baso % (Auto)  1.0    (0-2)  %


 


Absolute Neuts (auto)  4.6    (1.5-7.7)  10^3/ul


 


Absolute Lymphs (auto)  1.5    (1.0-4.8)  10^3/ul


 


Absolute Monos (auto)  0.7    (0-0.8)  10^3/ul


 


Absolute Eos (auto)  0.4    (0-0.6)  10^3/ul


 


Absolute Basos (auto)  0.1    (0-0.2)  10^3/ul


 


Absolute Nucleated RBC  0    10^3/ul


 


Nucleated RBC %  0    


 


ESR  21    (0-40)  mm/Hr


 


Sodium   136   (133-145)  mmol/L


 


Potassium   4.1   (3.5-5.0)  mmol/L


 


Chloride   104   (101-111)  mmol/L


 


Carbon Dioxide   27   (22-32)  mmol/L


 


Anion Gap   5   (2-11)  mmol/L


 


BUN   17   (6-24)  mg/dL


 


Creatinine   1.06   (0.67-1.17)  mg/dL


 


Est GFR ( Amer)   87.4   (>60)  


 


Est GFR (Non-Af Amer)   67.9   (>60)  


 


BUN/Creatinine Ratio   16.0   (8-20)  


 


Glucose   108 H   ()  mg/dL


 


Lactic Acid    0.8  (0.5-2.0)  mmol/L


 


Calcium   9.1   (8.6-10.3)  mg/dL


 


Total Bilirubin   1.30 H   (0.2-1.0)  mg/dL


 


AST   44 H   (13-39)  U/L


 


ALT   33   (7-52)  U/L


 


Alkaline Phosphatase   52   ()  U/L


 


Troponin I   0.01   (<0.04)  ng/mL


 


Total Protein   7.2   (6.4-8.9)  g/dL


 


Albumin   4.1   (3.2-5.2)  g/dL


 


Globulin   3.1   (2-4)  g/dL


 


Albumin/Globulin Ratio   1.3   (1-3)  











Result Diagrams: 


 09/10/17 22:05





 09/10/17 22:05


Lab Statement: Any lab studies that have been ordered have been reviewed, and 

results considered in the medical decision making process.





- EKG


  ** 22:01


Cardiac Rate: NL - 51 BPM


EKG Interpretation: A-Fib. No STEMI. 





Re-Evaluation





- Re-Evaluation


  ** First Eval


Re-Evaluation Time: 23:40


Change: Improved


Comment: Some relief of chest discomfort with SL NTG.





Chest Pain Course/Dx





- Course


Assessment/Plan: The pt is a 76 y/o M accompanied by two family members 

presenting to the ED c/o constant upper sternal CP that began yesterday 

afternoon. The pain radiates to his neck and shoulder blades. The pt rates the 

pain 4/10. The pain is aggravated by nothing and is alleviated by relaxation. 

The patient has treated the pain with nothing PTA. Pt additionally c/o mild SOB 

and a cough. He has a PMHx of A-Fib and he has been cardioverted on three 

separate times. In three days, he has an appointment with an 

electrophysiologist in [place] for an ablation. He is on warfarin, a beta 

blocker, ASA 81, and other medications.  PMHx: A-Fib, CAD, BPH. PSHx: two 

cardiac stents (). SHx: no smoking, no drinking, no illicit drugs. FHx: 

negative for aneurysms. His wife  in  from CHF. His cardiologist is Dr. Guerrero. He was told by Dr. Guerrero not to take NTG because it would have an 

adverse effect on me. In the ED course the patient was given ASA and NTG. At re

-evaluation at 23:39, he has some relief of CP with SL NTG. EKG shows A-Fib at 

51 BPM with no STEMI. CXR was obtained. I consulted with Dr. Frankenberg, 

hospitalist, who had a preliminary examination of the patient. The patient will 

be signed out from Dr. Edward to Dr. Lombardo at approximately 23:45 pending CTA 

and labs.





- Diagnoses


Provider Diagnoses: 


 Pericardial effusion, Chest pain








- Provider Notifications


Discussed Care Of Patient With: Fred Frankenberg


Time Discussed With Above Provider: 23:44


Instructed by Provider To: Other - I consulted with Dr. Frankenberg, hospitalist

, who had a preliminary examination of the patient.





Discharge





- Discharge Plan


Condition: Stable


Disposition: OTHER


Discharge Disposition Comment: Signed out from Cheyanne Lombardo pending CTA 

Chest and Labs





The documentation as recorded by the Marilia castro Thomas accurately reflects 

the service I personally performed and the decisions made by me, Daryl Edward MD.

## 2017-11-28 ENCOUNTER — HOSPITAL ENCOUNTER (EMERGENCY)
Dept: HOSPITAL 25 - ED | Age: 77
LOS: 1 days | Discharge: HOME | End: 2017-11-29
Payer: MEDICARE

## 2017-11-28 VITALS — SYSTOLIC BLOOD PRESSURE: 137 MMHG | DIASTOLIC BLOOD PRESSURE: 79 MMHG

## 2017-11-28 DIAGNOSIS — M54.9: ICD-10-CM

## 2017-11-28 DIAGNOSIS — R10.9: ICD-10-CM

## 2017-11-28 DIAGNOSIS — R33.9: Primary | ICD-10-CM

## 2017-11-28 PROCEDURE — 87086 URINE CULTURE/COLONY COUNT: CPT

## 2017-11-28 PROCEDURE — 99282 EMERGENCY DEPT VISIT SF MDM: CPT

## 2017-11-28 PROCEDURE — 87077 CULTURE AEROBIC IDENTIFY: CPT

## 2017-11-28 PROCEDURE — 81003 URINALYSIS AUTO W/O SCOPE: CPT

## 2017-11-28 PROCEDURE — 81015 MICROSCOPIC EXAM OF URINE: CPT

## 2017-11-29 NOTE — ED
Bryanna MAE Gabriel, scribtheresa for Sae Davalos on 11/28/17 at 2155 .





GI/ HPI





- HPI Summary


HPI Summary: 





This patient is a 77 year old M presenting to [facility] accompanied by family with 

a chief complaint of inability to urinate for 12 hours. Patient had an ablation 

done at [facility] earlier today where he was given anesthesia. The patient 

rates the pain 5/10 in severity. Patient reports ABD pain and back pain. 





- History of Current Complaint


Chief Complaint: EDUrogenitalProblems


Time Seen by Provider: 11/28/17 21:48


Stated Complaint: UNABLE TO URINATE


Hx Obtained From: Patient


Onset/Duration: Started Hours Ago - 12, Still Present


Timing: Constant


Pain Intensity: 5


Pain Radiates to: Back


Associated Signs and Symptoms: Positive: Other: - back pain





- Additional Pertinent History


Primary Care Physician: NAEEM





- Allergy/Home Medications


Allergies/Adverse Reactions: 


 Allergies











Allergy/AdvReac Type Severity Reaction Status Date / Time


 


Cephalexin Allergy  Rash And Verified 10/18/17 12:31





   Itching  














PMH/Surg Hx/FS Hx/Imm Hx


Previously Healthy: No


Endocrine/Hematology History: 


   Denies: Hx Diabetes, Hx Thyroid Disease


Cardiovascular History: Reports: Hx Angina, Hx Atrial Fibrillation, Hx 

Congenital Heart Disease, Hx Coronary Artery Disease - stent x2, Hx 

Hypercholesterolemia, Hx Hypertension, Hx Myocardial Infarction


   Denies: Hx Valvular Heart Disease


   Comment Only: Other Cardiovascular Problems/Disorders - Afib


Respiratory History: 


   Denies: Hx Asthma, Hx Chronic Obstructive Pulmonary Disease (COPD)


GI History: 


   Denies: Hx Ulcer


 History: Reports: Hx Benign Prostatic Hyperplasia, Hx Kidney Stones


   Denies: Hx Dialysis, Hx Renal Disease


Sensory History: Reports: Hx Contacts or Glasses - reading


   Denies: Hx Hearing Aid


Opthamlomology History: Reports: Hx Contacts or Glasses - reading





- Surgical History


Surgery Procedure, Year, and Place: 2 cardiac stents 2004.  hernia repair 2011


Infectious Disease History: No


Infectious Disease History: 


   Denies: Hx Hepatitis, Hx Human Immunodeficiency Virus (HIV), Traveled 

Outside the [place] in Last 30 Days





- Family History


Known Family History: 


   Negative: Other - NEG: aneurysms





- Social History


Alcohol Use: None


Hx Substance Use: No


Substance Use Type: Reports: None


Hx Tobacco Use: No


Smoking Status (MU): Never Smoked Tobacco


Have You Smoked in the Last Year: No





Review of Systems


Positive: Abdominal Pain


Positive: other - unable to urinate 


Positive: Other - back pain 


All Other Systems Reviewed And Are Negative: Yes





Physical Exam





- Summary


Physical Exam Summary: 





Appearance: Well appearing, no pain distress


Skin: warm, dry, reflects adequate perfusion


Head/face: normal


Eyes: EOMI, LAYLA


ENT: normal


Neck: supple, non-tender


Respiratory: CTA, breath sounds present


Cardiovascular: RRR, pulses symmetrical 


Abdomen: non-tender, soft. Lower abdomen is distended.


Bowel: present


Musculoskeletal: normal, strength/ROM intact


Neuro: normal, sensory motor intact, A&Ox3


Triage Information Reviewed: Yes


Vital Signs On Initial Exam: 


 Initial Vitals











Temp Pulse Resp BP Pulse Ox


 


 97.5 F   80   16   137/79   99 


 


 11/28/17 20:20  11/28/17 20:20  11/28/17 20:20  11/28/17 20:20  11/28/17 20:20











Vital Signs Reviewed: Yes





Diagnostics





- Vital Signs


 Vital Signs











  Temp Pulse Resp BP Pulse Ox


 


 11/28/17 20:20  97.5 F  80  16  137/79  99














- Laboratory


Lab Results: 


 Lab Results











  11/28/17 Range/Units





  20:40 


 


Urine Color  Yellow  


 


Urine Appearance  Cloudy  


 


Urine pH  5.0  (5-9)  


 


Ur Specific Gravity  1.014  (1.010-1.030)  


 


Urine Protein  1+(30 mg/dl) H  (Negative)  


 


Urine Ketones  Negative  (Negative)  


 


Urine Blood  3+ H  (Negative)  


 


Urine Nitrate  Negative  (Negative)  


 


Urine Bilirubin  Negative  (Negative)  


 


Urine Urobilinogen  Negative  (Negative)  


 


Ur Leukocyte Esterase  1+ H  (Negative)  


 


Urine WBC (Auto)  Trace(0-5/hpf)  (Absent)  


 


Urine RBC (Auto)  3+(>10/hpf) H  (Absent)  


 


Urine Bacteria  Absent  (Absent)  


 


Urine Glucose  Negative  (Negative)  











Lab Statement: Any lab studies that have been ordered have been reviewed, and 

results considered in the medical decision making process.





GIGU Course/Dx





- Course


Assessment/Plan: This patient is a 77 year old M presenting with a chief 

complaint of inability to urinate for 12 hours after having an ablation today. 

A UA was done with no significant abnormalities. Patient had a rm 

catheterization and 1000CC of urine was drained, patient feels better. Patient 

will be sent home with a catheter and urine collecting bag. Patient will be 

discharged with a Dx of urinary retention and follow up from Dr. PCP in 3 days.

  The patient is agreeable with this plan.





- Diagnoses


Provider Diagnoses: 


 Urinary retention








Discharge





- Discharge Plan


Condition: Stable


Disposition: HOME


Patient Education Materials:  Urinary Retention in Men (ED)


Referrals: 


Nelly Dueñas MD [Primary Care Provider] - 3 Days


Additional Instructions: 


RETURN TO THE EMERGENCY DEPARTMENT FOR CHANGING OR WORSENING SYMPTOMS.





The documentation as recorded by the Bryanna castro Gabriel accurately reflects 

the service I personally performed and the decisions made by me, Sae Davalos.

## 2018-04-01 ENCOUNTER — HOSPITAL ENCOUNTER (INPATIENT)
Dept: HOSPITAL 25 - ED | Age: 78
LOS: 3 days | Discharge: HOME | DRG: 446 | End: 2018-04-04
Attending: SURGERY | Admitting: HOSPITALIST
Payer: MEDICARE

## 2018-04-01 DIAGNOSIS — Z56.0: ICD-10-CM

## 2018-04-01 DIAGNOSIS — I25.10: ICD-10-CM

## 2018-04-01 DIAGNOSIS — Z88.1: ICD-10-CM

## 2018-04-01 DIAGNOSIS — I10: ICD-10-CM

## 2018-04-01 DIAGNOSIS — R79.1: ICD-10-CM

## 2018-04-01 DIAGNOSIS — I25.2: ICD-10-CM

## 2018-04-01 DIAGNOSIS — I48.0: ICD-10-CM

## 2018-04-01 DIAGNOSIS — E78.5: ICD-10-CM

## 2018-04-01 DIAGNOSIS — Z83.79: ICD-10-CM

## 2018-04-01 DIAGNOSIS — Z87.442: ICD-10-CM

## 2018-04-01 DIAGNOSIS — K80.00: Primary | ICD-10-CM

## 2018-04-01 DIAGNOSIS — Z80.41: ICD-10-CM

## 2018-04-01 DIAGNOSIS — Z84.1: ICD-10-CM

## 2018-04-01 DIAGNOSIS — K21.9: ICD-10-CM

## 2018-04-01 DIAGNOSIS — Z95.5: ICD-10-CM

## 2018-04-01 DIAGNOSIS — I35.0: ICD-10-CM

## 2018-04-01 DIAGNOSIS — K59.00: ICD-10-CM

## 2018-04-01 DIAGNOSIS — N40.0: ICD-10-CM

## 2018-04-01 DIAGNOSIS — Z82.49: ICD-10-CM

## 2018-04-01 LAB
BASOPHILS # BLD AUTO: 0 10^3/UL (ref 0–0.2)
EOSINOPHIL # BLD AUTO: 0.1 10^3/UL (ref 0–0.6)
HCT VFR BLD AUTO: 35 % (ref 42–52)
HGB BLD-MCNC: 11.8 G/DL (ref 14–18)
INR PPP/BLD: 1.93 (ref 0.77–1.02)
LYMPHOCYTES # BLD AUTO: 0.6 10^3/UL (ref 1–4.8)
MCH RBC QN AUTO: 31 PG (ref 27–31)
MCHC RBC AUTO-ENTMCNC: 34 G/DL (ref 31–36)
MCV RBC AUTO: 92 FL (ref 80–94)
MONOCYTES # BLD AUTO: 0.6 10^3/UL (ref 0–0.8)
NEUTROPHILS # BLD AUTO: 4.4 10^3/UL (ref 1.5–7.7)
NRBC # BLD AUTO: 0 10^3/UL
NRBC BLD QL AUTO: 0
PLATELET # BLD AUTO: 179 10^3/UL (ref 150–450)
RBC # BLD AUTO: 3.77 10^6/UL (ref 4–5.4)
WBC # BLD AUTO: 5.7 10^3/UL (ref 3.5–10.8)

## 2018-04-01 PROCEDURE — 83735 ASSAY OF MAGNESIUM: CPT

## 2018-04-01 PROCEDURE — 83605 ASSAY OF LACTIC ACID: CPT

## 2018-04-01 PROCEDURE — 80053 COMPREHEN METABOLIC PANEL: CPT

## 2018-04-01 PROCEDURE — 76705 ECHO EXAM OF ABDOMEN: CPT

## 2018-04-01 PROCEDURE — 85610 PROTHROMBIN TIME: CPT

## 2018-04-01 PROCEDURE — 78226 HEPATOBILIARY SYSTEM IMAGING: CPT

## 2018-04-01 PROCEDURE — 93005 ELECTROCARDIOGRAM TRACING: CPT

## 2018-04-01 PROCEDURE — 85730 THROMBOPLASTIN TIME PARTIAL: CPT

## 2018-04-01 PROCEDURE — 83690 ASSAY OF LIPASE: CPT

## 2018-04-01 PROCEDURE — 84484 ASSAY OF TROPONIN QUANT: CPT

## 2018-04-01 PROCEDURE — A9537 TC99M MEBROFENIN: HCPCS

## 2018-04-01 PROCEDURE — G0378 HOSPITAL OBSERVATION PER HR: HCPCS

## 2018-04-01 PROCEDURE — 85025 COMPLETE CBC W/AUTO DIFF WBC: CPT

## 2018-04-01 PROCEDURE — 82248 BILIRUBIN DIRECT: CPT

## 2018-04-01 PROCEDURE — 36415 COLL VENOUS BLD VENIPUNCTURE: CPT

## 2018-04-01 PROCEDURE — 99284 EMERGENCY DEPT VISIT MOD MDM: CPT

## 2018-04-01 PROCEDURE — 86140 C-REACTIVE PROTEIN: CPT

## 2018-04-01 PROCEDURE — 71045 X-RAY EXAM CHEST 1 VIEW: CPT

## 2018-04-01 PROCEDURE — 82150 ASSAY OF AMYLASE: CPT

## 2018-04-01 SDOH — ECONOMIC STABILITY - INCOME SECURITY: UNEMPLOYMENT, UNSPECIFIED: Z56.0

## 2018-04-02 LAB
BASOPHILS # BLD AUTO: 0.1 10^3/UL (ref 0–0.2)
EOSINOPHIL # BLD AUTO: 0.2 10^3/UL (ref 0–0.6)
HCT VFR BLD AUTO: 36 % (ref 42–52)
HGB BLD-MCNC: 12.2 G/DL (ref 14–18)
LYMPHOCYTES # BLD AUTO: 0.6 10^3/UL (ref 1–4.8)
MCH RBC QN AUTO: 31 PG (ref 27–31)
MCHC RBC AUTO-ENTMCNC: 34 G/DL (ref 31–36)
MCV RBC AUTO: 91 FL (ref 80–94)
MONOCYTES # BLD AUTO: 0.7 10^3/UL (ref 0–0.8)
NEUTROPHILS # BLD AUTO: 5.1 10^3/UL (ref 1.5–7.7)
NRBC # BLD AUTO: 0 10^3/UL
NRBC BLD QL AUTO: 0
PLATELET # BLD AUTO: 183 10^3/UL (ref 150–450)
RBC # BLD AUTO: 3.92 10^6/UL (ref 4–5.4)
WBC # BLD AUTO: 6.6 10^3/UL (ref 3.5–10.8)

## 2018-04-02 RX ADMIN — DEXTROSE MONOHYDRATE, SODIUM CHLORIDE, AND POTASSIUM CHLORIDE SCH MLS/HR: 50; 4.5; 1.49 INJECTION, SOLUTION INTRAVENOUS at 03:52

## 2018-04-02 RX ADMIN — FAMOTIDINE SCH MG: 10 INJECTION, SOLUTION INTRAVENOUS at 09:07

## 2018-04-02 RX ADMIN — DEXTROSE MONOHYDRATE, SODIUM CHLORIDE, AND POTASSIUM CHLORIDE SCH MLS/HR: 50; 4.5; 1.49 INJECTION, SOLUTION INTRAVENOUS at 22:49

## 2018-04-02 RX ADMIN — FAMOTIDINE SCH MG: 10 INJECTION, SOLUTION INTRAVENOUS at 20:01

## 2018-04-02 RX ADMIN — DEXTROSE MONOHYDRATE, SODIUM CHLORIDE, AND POTASSIUM CHLORIDE SCH MLS/HR: 50; 4.5; 1.49 INJECTION, SOLUTION INTRAVENOUS at 14:47

## 2018-04-02 RX ADMIN — EZETIMIBE SCH MG: 10 TABLET ORAL at 17:31

## 2018-04-02 RX ADMIN — LISINOPRIL SCH MG: 10 TABLET ORAL at 14:16

## 2018-04-02 RX ADMIN — NIFEDIPINE SCH MG: 30 TABLET, FILM COATED, EXTENDED RELEASE ORAL at 14:16

## 2018-04-02 NOTE — RAD
INDICATION:  Chest pain.



COMPARISON:  Comparison is made with a prior chest x-ray study from September 10, 2017.



TECHNIQUE: A portable view of the chest was obtained.



FINDINGS: The cardiac silhouette appears moderately enlarged and unchanged from the prior

exam.



The lungs are clear. No pleural effusion is seen.



IMPRESSION:  MODERATE ENLARGEMENT OF THE CARDIAC SILHOUETTE, UNCHANGED.

## 2018-04-02 NOTE — CONS
CC:  Dr. Nelly Dueñas; Dr. Lemons; Dr. Tsang *

 

CONSULTATION REPORT:

 

DATE OF CONSULT:  04/02/18

 

PRIMARY CARE PROVIDER:  Dr. Nelly Dueñas.

 

ATTENDING PHYSICIAN WHILE IN THE HOSPITAL:  Dr. Rosa Rosales (report being 
dictated by Jamil Biggs NP)

 

REQUESTING PHYSICIAN IN CONSULT:  Dr. Lemons.

 

REASON FOR MEDICAL CONSULT:  Evaluation and medical management of comorbid 
medical problems.

 

CHIEF COMPLAINT:  Abdominal discomfort, chest discomfort.

 

HISTORY OF PRESENT ILLNESS:  Mr. Yanes is a 77-year-old male patient.  He has 
a history of CAD, MI, paroxysmal AFib.  He is status post ablation in November 
at [facility].  He does not remember the name of the 
electrophysiologist.  He also has moderate-to-severe AS, history of a small 
pericardial effusion in the past, hypertension, hyperlipidemia, nephrolithiasis
, and GERD.  He comes in today. He states yesterday he was feeling well, he 
went to [employer], he had a meal consisting of sausage, gravy, and biscuits.  He 
states shortly thereafter, he started having what he felt was like gas pain.  
He felt indigestion, epigastric discomfort.  He tried burping, but he just was 
not feeling like he could.  The pain progressively got worse.  It started going 
up in to his chest.  He never felt short of breath.  He never had any pain 
going up in to his neck or jaw or down his arm. He states he did not feel 
nauseous, he did not vomit, and he had no diarrhea or fevers, but he was 
concerned because the discomfort was not going away, it was getting worse and 
was pretty constant throughout the entire day.  It was nonexertional.  It 
happened and it was continuing to not be relieved.  He was concerned because of 
his history of heart disease.  He thought maybe he was having another heart 
attack.  He called his son around 10 o'clock last night; he felt his father 
should be evaluated in the hospital, so he called 911 and came in.  He denies 
any symptoms now.  He says he is feeling better.  He says that he is a little 
sore and tender in the epigastric area.  He is able to reproduce the pain. He 
says that he had no calf pain or leg pain.  He did not feel short of breath and 
he says that discomfort again was constant and he has not been having any 
episodes of chest discomfort like this recently.  He came in, was evaluated.  
It was noted that his LFTs were elevated.  There was concern as there was fluid 
around the gallbladder for possible acute cholecystitis.  Surgical services 
admitted the patient for evaluation and we were asked to evaluate in consult 
due to his medical complexity.

 

PAST MEDICAL HISTORY:  Significant for:

1.  CAD.

2.  MI.

3.  AFib.

4.  History of a pericardial effusion requiring no intervention.

5.  Moderate-to-severe aortic stenosis, being followed with Dr. Guerrero.

6.  Hypertension.

7.  Hyperlipidemia.

8.  Nephrolithiasis.

9.  GERD.

 

PAST SURGICAL HISTORY:

1.  He has had heart catheterization x2.

2.  Hernia repair.

3.  Tonsillectomy.

4.  He has had cardioversion x4 and he has had an ablation done in November at 
[facility].

 

MEDICATIONS:  Home meds according to the list that was provided include:

1.  Flomax 0.4 mg daily.

2.  Finasteride 5 mg daily.

3.  Warfarin 1.25 to 2.5 mg p.o. daily as directed.

4.  B12 500 mcg p.o. daily.

5.  Zetia 10 mg p.o. q.p.m.

6.  Crestor 40 mg daily.

7.  Klor-Con 10 mEq p.o. daily.

8.  Nifedipine 30 mg p.o. daily.

9.  Benazepril 10 mg daily.

10.  Aspirin 81 mg daily.

 

ALLERGIES TO MEDICATIONS:  Include KEFLEX.

 

FAMILY HISTORY:  His mother had ovarian cancer.  Father's history is unknown.

 

SOCIAL HISTORY:  The patient does not smoke.  He does not drink.  Surrogate 
decision makers are his children.

 

REVIEW OF SYSTEMS:  There is no documented fever.  He denies having any 
significant weight change.  There was no double vision.  He denies having any 
ear discharge. There is no rhinorrhea.  No sore throat. No thyroid enlargement.
  He did admit to having chest discomfort from my HPI.  He denies having any 
nausea or vomiting.  He did admit to abdominal pain and epigastric discomfort 
described as a pressure that seemed to be worst after eating the sausage, gravy
, and biscuits.  He denies any dysuria or frequency.  He did have problem with 
urinary retention after his ablation, but he says he has not been having any 
issues with urination now.  Denies any lightheadedness.  No loss of 
consciousness.  No pruritus and no skin ulcerations.  Review of 14 systems 
completed, all others negative.

 

PHYSICAL EXAM:  Vital Signs:  Blood pressure 137/67, pulse of 64, respirations 
were 18, his O2 saturation 99%, temperature 97.4.  General:  At this time, Mr. Yanes is a 77-year-old male patient.  He is sitting in the hospital bed.  He 
does not appear to be in any acute distress.  He is awake and he is alert.  
HEENT:  Head: Atraumatic, normocephalic.  Eyes:  EOMs intact.  Sclerae 
anicteric and not pale. Throat:  Oral mucosa appears to be moist.  No 
oropharyngeal erythema.  Neck was supple.  Lungs were clear to auscultation 
bilaterally.  No wheezes, rales, or rhonchi.  Heart:  Sounds S1, S2.  Regular 
rate and rhythm.  He does have a grade 2/3 aortic murmur, systolic.  Abdomen 
was soft, it was flat.  I did not elicit any tenderness on my exam and he did 
have bowel sounds.  Extremities:  Pulses were 2+ throughout.  He is moving all 
4 extremities.  No peripheral edema.  Neurologically, he is awake, alert, 
oriented x3.  He had no gross focal deficits.  His skin is intact.

 

DIAGNOSTIC STUDIES/LAB DATA:  WBC of 6.6; RBC of 3.92; hemoglobin of 12.2; 
hematocrit was 37, it seems to be right at his baseline; his platelet count was 
183.  INR 1.93, PTT of 40.1.  His sodium 142; potassium 3.4, it is being 
replaced; chloride 109; bicarb 26; BUN 12; creatinine 1.01; glucose 104; 
lactate 0.7; calcium is 8.7.  He had serial troponins all of which were 
negative.  His lipase and amylase were negative.  His bili was 2.0, , 
, alk phos 112.

 

He did have multiple imaging here in the hospital starting out with a chest x-
ray, which showed moderate enlargement of the cardiac silhouette, which is 
unchanged.

 

He had an EKG obtained today, which I did review, which showed a normal sinus 
rhythm, rate of 65, he had inverted T waves in V1, also in lead III.  Looking 
back the previous EKGs, he has had this in the past.  He has had biphasic T 
waves in V1 and he has had biphasic T waves in lead III prior, but no 
significant changes were noted.

 

He has had a hepatobiliary scan today, impression:  No evidence of acute 
cholecystitis or cystic duct obstruction.  He had a gallbladder ultrasound 
obtained last night, which showed small gallstones, trace pericholecystic fluid 
suggestive of possible early acute cholecystitis, recommend clinical 
correlation.

 

The patient did have a stress test just done in September 2017, impression:  No 
stress-induced ischemic event, grossly fixed inferior wall defect, may 
represent diaphragmatic attenuation or infarct, borderline dilated left 
ventricle with estimated end diastolic volume of 125 cc, septal and less marked 
inferior wall hypokinesis, nonetheless left ventricular ejection fraction 
remains within normal range, low risk scan.  He did have an echo at that time 
as well, which revealed impression of an EF of 55% to 60%, he had moderate 
aortic stenosis, evidence of aortic regurg, moderate mitral regurg, mild 
tricuspid regurg, moderate pulmonary hypertension, small pericardial effusion.  
No significant change compared to 03/07/17 exam.  Old medical records were 
reviewed.

 

ASSESSMENT AND PLAN:  Mr. Yanes is a 77-year-old male patient coming in to 
the surgical services today due to concern for acute cholecystitis.  We were 
asked to evaluate in consult.  Recommendations at this point are:

 

1.  Concern for acute cholecystitis, possible biliary colic.  At this point, 
the patient's exam is benign.  He had no white count.  No fever.  I do not 
believe he has acute cholecystitis.  Surgery is evaluating.  I am more 
concerned that he may have passed a stone, which would explain his bumped liver 
function tests.  I would like to reevaluate these tomorrow, place him on clear 
liquid diet.  I did touch base with Dr. Tsang, who would like to make the 
patient n.p.o. because if his liver function tests trend down, we would most 
likely proceed to the OR.  In terms of perioperative risk stratification, I 
think with his history of coronary artery disease, he is at a moderate risk for 
complications related to the anesthesia.  I am going to, to be safe, have 
Cardiology evaluate the patient, but he has had recent workup just done in 
November and he has not had any other cardiac symptom, so from my standpoint, I 
think he is medically optimized pending Cardiology evaluation, which I will 
have a call out to them.

2.  Coronary artery disease.  I would continue his meds as prescribed.  I held 
the aspirin today, but I would get him back on it as soon as possible.

3.  History of atrial fibrillation.  Again, we are holding his warfarin as 
there is possibility he could have procedure tomorrow.  I did not reverse him 
as he is 1.93 and the patient said that he has had a tough time being managed 
in the outpatient setting with his INR.  So, we will reevaluate tomorrow.  We 
could always, if they want to take him to the OR tomorrow, do FFP and vitamin K 
shots tomorrow, but we will continue to follow.

4.  Moderate-to-severe aortic stenosis.  Continue to follow with Dr. Guerrero.  
He appears to be asymptomatic from this.  We will need to keep an eye on his 
fluid status as we do not want him to be hypotensive, as this could lead to 
syncope.

5.  Hypertension.  Continue meds as prescribed.  I will hold his ACE inhibitor 
tomorrow in case of surgery.

6.  Hyperlipidemia.  Continue statin therapy.

7.  Gastroesophageal reflux disease.  Continue PPI therapy.

8.  History of urinary retention after ablation in November.  At this point, 
not an active issue.  We are going to look out for this if he does  proceed 
with surgery in the postoperative setting.

9.  DVT prophylaxis.  I will defer to the primary team, although his INR is 1.93
, when it drifts down below 1.5, we can consider starting heparin subcu and 
getting him back on his warfarin.

10.  Code status.  Full code.

11.  Fluids, electrolytes, and nutrition.  Clear liquid diet and n.p.o. after 
midnight and I am replacing his potassium.

 

TIME SPENT:  On consult 60 minutes, greater than half the time spent face-to-
face with the patient obtaining my history and physical, the other half time 
was spent going over the plan of care with the patient, implementing the plan 
of care.

 

I discussed the plan of care with my attending, Dr. Rosales; she is in agreement.

 

____________________________________ JAMIL BIGGS NP

 

[id number]/CPS #: [id number]

KATINA

## 2018-04-02 NOTE — ED
Abdominal Pain/Male





- HPI Summary


HPI Summary: 





Patient with a history of A. fib, hypertension, hypercholesterolemia, 

hyperlipidemia with ablation 1 month ago.  He denies having any symptoms since 

that time, but today while at rest during [place] started to have epigastric 

pain which radiated through to the back between the shoulder blades with 

associated numbness and tingling to the left arm and hand.  The numbness and 

tingling quickly dissipated after several minutes, but the pain remained.  He 

endorses some anxiety and states this could be the cause.  Denies any nausea, 

vomiting.  Abdominal pain is discretely located in the epigastric region 

without radiation to the other abdominal quadrants.  He continues to take his 

at home medications as prescribed.  Symptoms are aggravated with nothing and 

alleviated with nothing.  Symptoms are not worsened after eating.  





- History of Current Complaint


Chief Complaint: EDAbdPain


Stated Complaint: CHEST DISCOMFORT/ABD PAIN


Time Seen by Provider: 04/01/18 22:23


Hx Obtained From: Patient


Onset/Duration: Sudden Onset


Timing: Constant


Severity Initially: Mild


Severity Currently: Mild


Pain Intensity: 2


Pain Scale Used: 0-10 Numeric


Location: Discrete At: RUQ


Radiates: Yes


Radiates to: Back


Character: Dull, Burning, Cramping


Aggravating Factor(s): Nothing


Alleviating Factor(s): Nothing


Associated Signs And Symptoms: Positive: Negative





- Risk Factors


Testicular Torsion: Negative


Cardiac Risk Factors: Hypertension, Elevated Lipids, Family History





- Allergies/Home Medications


Allergies/Adverse Reactions: 


 Allergies











Allergy/AdvReac Type Severity Reaction Status Date / Time


 


cephalexin Allergy  Rash Verified 04/01/18 22:18











Home Medications: 


 Home Medications





Finasteride TAB* 5 mg PO DAILY 04/01/18 [History Confirmed 04/01/18]


Tamsulosin CAP* 0.4 mg PO DAILY 04/01/18 [History Confirmed 04/01/18]











PMH/Surg Hx/FS Hx/Imm Hx


Previously Healthy: Yes


Endocrine/Hematology History: 


   Denies: Hx Diabetes, Hx Thyroid Disease


Cardiovascular History: Reports: Hx Angina, Hx Atrial Fibrillation, Hx 

Congenital Heart Disease, Hx Coronary Artery Disease - stent x2, Hx 

Hypercholesterolemia, Hx Hypertension, Hx Myocardial Infarction


   Denies: Hx Valvular Heart Disease


   Comment Only: Other Cardiovascular Problems/Disorders - Afib


Respiratory History: 


   Denies: Hx Asthma, Hx Chronic Obstructive Pulmonary Disease (COPD)


GI History: 


   Denies: Hx Ulcer


 History: Reports: Hx Benign Prostatic Hyperplasia, Hx Kidney Stones


   Denies: Hx Dialysis, Hx Renal Disease


Sensory History: Reports: Hx Contacts or Glasses - reading


   Denies: Hx Hearing Aid


Opthamlomology History: Reports: Hx Contacts or Glasses - reading





- Surgical History


Surgery Procedure, Year, and Place: 2 cardiac stents 2004.  hernia repair 2011





- Immunization History


Hx Pertussis Vaccination: No


Immunizations Up to Date: Unable to Obtain/Confirm


Infectious Disease History: No


Infectious Disease History: 


   Denies: Hx Hepatitis, Hx Human Immunodeficiency Virus (HIV), Traveled 

Outside the [place] in Last 30 Days





- Family History


Known Family History: 


   Negative: Other - NEG: aneurysms





- Social History


Occupation: Unemployed


Lives: With Family


Alcohol Use: None


Hx Substance Use: No


Substance Use Type: Reports: None


Hx Tobacco Use: No


Smoking Status (MU): Never Smoked Tobacco


Have You Smoked in the Last Year: No





Review of Systems


Constitutional: Negative


Negative: Fever, Chills, Fatigue, Skin Diaphoresis


Eyes: Negative


Respiratory: Negative


Positive: Abdominal Pain - Epigastric with negative bro's


Positive: no symptoms reported, see HPI


Musculoskeletal: Negative


Skin: Negative


Psychological: Normal


All Other Systems Reviewed And Are Negative: Yes





Physical Exam


Triage Information Reviewed: Yes


Vital Signs On Initial Exam: 


 Initial Vitals











Temp Pulse Resp BP Pulse Ox


 


 98.3 F   63   17   163/81   99 


 


 04/01/18 22:14  04/01/18 22:14  04/01/18 22:14  04/01/18 22:14  04/01/18 22:14











Vital Signs Reviewed: Yes


Appearance: Positive: Well-Appearing, No Pain Distress, Well-Nourished


Skin: Positive: Warm, Skin Color Reflects Adequate Perfusion


Head/Face: Positive: Normal Head/Face Inspection


Eyes: Positive: EOMI, LAYLA, Conjunctiva Clear


Neck: Positive: Supple, Nontender, No Lymphadenopathy


Respiratory/Lung Sounds: Positive: Clear to Auscultation, Breath Sounds Present


Cardiovascular: Positive: Normal, RRR, Pulses are Symmetrical in both Upper and 

Lower Extremities


Abdomen Description: Positive: Soft, Other: - tenderness to the epigastric 

region


Musculoskeletal: Positive: Normal, Strength/ROM Intact


Neurological: Positive: Sensory/Motor Intact, Alert, Oriented to Person Place, 

Time, Speech Normal


Psychiatric: Positive: Normal, Affect/Mood Appropriate


AVPU Assessment: Alert





Diagnostics





- Vital Signs


 Vital Signs











  Temp Pulse Resp BP Pulse Ox


 


 04/02/18 00:01   60  17  153/74  98


 


 04/02/18 00:00   62  20   99


 


 04/01/18 23:30   67  25  160/80  96


 


 04/01/18 23:00   62  22  158/83  98


 


 04/01/18 22:30   65  17  162/83  99


 


 04/01/18 22:19   67  23   98


 


 04/01/18 22:18     163/81 


 


 04/01/18 22:14  98.3 F  63  17  163/81  99














- Laboratory


Lab Results: 


 Lab Results











  04/01/18 04/01/18 04/01/18 Range/Units





  22:35 22:35 22:35 


 


WBC  5.7    (3.5-10.8)  10^3/ul


 


RBC  3.77 L    (4.0-5.4)  10^6/ul


 


Hgb  11.8 L    (14.0-18.0)  g/dl


 


Hct  35 L    (42-52)  %


 


MCV  92    (80-94)  fL


 


MCH  31    (27-31)  pg


 


MCHC  34    (31-36)  g/dl


 


RDW  14    (10.5-15)  %


 


Plt Count  179    (150-450)  10^3/ul


 


MPV  7.9    (7.4-10.4)  um3


 


Neut % (Auto)  76.9    (38-83)  %


 


Lymph % (Auto)  9.9 L    (25-47)  %


 


Mono % (Auto)  10.2 H    (0-7)  %


 


Eos % (Auto)  2.2    (0-6)  %


 


Baso % (Auto)  0.8    (0-2)  %


 


Absolute Neuts (auto)  4.4    (1.5-7.7)  10^3/ul


 


Absolute Lymphs (auto)  0.6 L    (1.0-4.8)  10^3/ul


 


Absolute Monos (auto)  0.6    (0-0.8)  10^3/ul


 


Absolute Eos (auto)  0.1    (0-0.6)  10^3/ul


 


Absolute Basos (auto)  0    (0-0.2)  10^3/ul


 


Absolute Nucleated RBC  0    10^3/ul


 


Nucleated RBC %  0    


 


INR (Anticoag Therapy)     (0.77-1.02)  


 


APTT     (26.0-36.3)  seconds


 


Sodium   140   (139-145)  mmol/L


 


Potassium   3.9   (3.5-5.0)  mmol/L


 


Chloride   105   (101-111)  mmol/L


 


Carbon Dioxide   28   (22-32)  mmol/L


 


Anion Gap   7   (2-11)  mmol/L


 


BUN   16   (6-24)  mg/dL


 


Creatinine   1.16   (0.67-1.17)  mg/dL


 


Est GFR ( Amer)   78.7   (>60)  


 


Est GFR (Non-Af Amer)   61.2   (>60)  


 


BUN/Creatinine Ratio   13.8   (8-20)  


 


Glucose   122 H   ()  mg/dL


 


Lactic Acid    0.7  (0.5-2.0)  mmol/L


 


Calcium   8.9   (8.6-10.3)  mg/dL


 


Magnesium   2.1   (1.9-2.7)  mg/dL


 


Total Bilirubin   1.90 H   (0.2-1.0)  mg/dL


 


AST   328 H   (13-39)  U/L


 


ALT   158 H   (7-52)  U/L


 


Alkaline Phosphatase   83   ()  U/L


 


Troponin I   0.02   (<0.04)  ng/mL


 


Total Protein   6.8   (6.4-8.9)  g/dL


 


Albumin   3.8   (3.2-5.2)  g/dL


 


Globulin   3.0   (2-4)  g/dL


 


Albumin/Globulin Ratio   1.3   (1-3)  














  04/01/18 Range/Units





  22:35 


 


WBC   (3.5-10.8)  10^3/ul


 


RBC   (4.0-5.4)  10^6/ul


 


Hgb   (14.0-18.0)  g/dl


 


Hct   (42-52)  %


 


MCV   (80-94)  fL


 


MCH   (27-31)  pg


 


MCHC   (31-36)  g/dl


 


RDW   (10.5-15)  %


 


Plt Count   (150-450)  10^3/ul


 


MPV   (7.4-10.4)  um3


 


Neut % (Auto)   (38-83)  %


 


Lymph % (Auto)   (25-47)  %


 


Mono % (Auto)   (0-7)  %


 


Eos % (Auto)   (0-6)  %


 


Baso % (Auto)   (0-2)  %


 


Absolute Neuts (auto)   (1.5-7.7)  10^3/ul


 


Absolute Lymphs (auto)   (1.0-4.8)  10^3/ul


 


Absolute Monos (auto)   (0-0.8)  10^3/ul


 


Absolute Eos (auto)   (0-0.6)  10^3/ul


 


Absolute Basos (auto)   (0-0.2)  10^3/ul


 


Absolute Nucleated RBC   10^3/ul


 


Nucleated RBC %   


 


INR (Anticoag Therapy)  1.93 H  (0.77-1.02)  


 


APTT  40.1 H  (26.0-36.3)  seconds


 


Sodium   (139-145)  mmol/L


 


Potassium   (3.5-5.0)  mmol/L


 


Chloride   (101-111)  mmol/L


 


Carbon Dioxide   (22-32)  mmol/L


 


Anion Gap   (2-11)  mmol/L


 


BUN   (6-24)  mg/dL


 


Creatinine   (0.67-1.17)  mg/dL


 


Est GFR ( Amer)   (>60)  


 


Est GFR (Non-Af Amer)   (>60)  


 


BUN/Creatinine Ratio   (8-20)  


 


Glucose   ()  mg/dL


 


Lactic Acid   (0.5-2.0)  mmol/L


 


Calcium   (8.6-10.3)  mg/dL


 


Magnesium   (1.9-2.7)  mg/dL


 


Total Bilirubin   (0.2-1.0)  mg/dL


 


AST   (13-39)  U/L


 


ALT   (7-52)  U/L


 


Alkaline Phosphatase   ()  U/L


 


Troponin I   (<0.04)  ng/mL


 


Total Protein   (6.4-8.9)  g/dL


 


Albumin   (3.2-5.2)  g/dL


 


Globulin   (2-4)  g/dL


 


Albumin/Globulin Ratio   (1-3)  











Result Diagrams: 


 04/02/18 05:16





 04/02/18 05:16


Lab Statement: Any lab studies that have been ordered have been reviewed, and 

results considered in the medical decision making process.





Abdominal Pain Fem Course/Dx





- Course


Course Of Treatment: During the course of treatment, the patient is evaluated 

for pain in the epigastric region.  Pain radiates to between the shoulder 

blades to the back with associated numbness and tingling down the arm.  

Symptoms are much better at this time compared to earlier in the day.  Symptoms 

were not aggravated with food or alleviated with medications or rest.  He 

endorses a 2 out of 10 pain.  Pain is constant and aching.  He denies any 

burning sensation.  He is otherwise healthy states that he takes his 

medications daily.  1st and 2nd Troponin remain at 0.02.  Famotidine given with 

effect.  AST/ALT show 328 and 158 respectively.  Total Bili is 1.90.  Assessing 

previous visits, this appears to be new.  US obtained.  US shows report shows 

moderate suspician for cholecystitis.  I consulted with Dr. Stevenson to discuss 

other possibilities of his elevated LFT's including new hepatotoxicity from 

cholesterol medications, drug reactions, acute viral hepatitis.  Dr. Stevenson 

recommended consult with surgery.  Dr. Lemons called who agrees to come see 

patient.  Admit for observation under surgery.





- Diagnoses


Differential Diagnosis/HQI/PQRI: Hepatitis, Pancreatitis, Peptic Ulcer Disease, 

Renal Colic


Provider Diagnoses: 


 Cholecystitis








- Provider Notifications


Discussed Care Of Patient With: Vanessa Lemons





Discharge





- Sign-Out/Discharge


Documenting (check all that apply): Discharge





- Discharge Plan


Condition: Good


Disposition: ADMITTED TO [facility]





- Billing Disposition and Condition


Condition: GOOD


Disposition: [facility]

## 2018-04-02 NOTE — ED
Progress





- Progress Note


Progress Note: 





Final x-ray report:


"Impression: Moderate enlargement of the cardiac silhouette, unchanged."


Patient admitted to [facility] [place].





Course/Dx





- Course


Course Of Treatment: During the course of treatment, the patient is evaluated 

for pain in the epigastric region.  Pain radiates to between the shoulder 

blades to the back with associated numbness and tingling down the arm.  

Symptoms are much better at this time compared to earlier in the day.  Symptoms 

were not aggravated with food or alleviated with medications or rest.  He 

endorses a 2 out of 10 pain.  Pain is constant and aching.  He denies any 

burning sensation.  He is otherwise healthy states that he takes his 

medications daily.  1st and 2nd Troponin remain at 0.02.  Famotidine given with 

effect.  AST/ALT show 328 and 158 respectively.  Total Bili is 1.90.  Assessing 

previous visits, this appears to be new.  US obtained.  US shows report shows 

moderate suspician for cholecystitis.  I consulted with Dr. Stevenson to discuss 

other possibilities of his elevated LFT's including new hepatotoxicity from 

cholesterol medications, drug reactions, acute viral hepatitis.  Dr. Stevenson 

recommended consult with surgery.  Dr. Lemons called who agrees to come see 

patient.





- Diagnoses


Provider Diagnoses: 


 Cholecystitis








Discharge





- Sign-Out/Discharge


Documenting (check all that apply): Post-Discharge Follow Up





- Discharge Plan


Disposition: ADMITTED TO [facility]





- Billing Disposition and Condition


Disposition: [facility]

## 2018-04-02 NOTE — RAD
INDICATION:  Elevated liver enzymes.



COMPARISON:  Comparison is made with a prior CT angiogram of the abdomen from September 11, 2017.



TECHNIQUE: Multiple real-time images of the right upper quadrant were obtained.



FINDINGS: There are multiple small gallstones. The gallbladder appears slightly distended.

There is a trace amount of pericholecystic fluid. No positive sonographic Swenson sign was

present. No gallbladder wall thickening is noted.  No intra or extrahepatic ductal

distention is present.  The common bile duct measured 0.6 cm in diameter.



The liver is normal in size without significant focal abnormality. The pancreas is

partially obscured by overlying bowel gas.



The right kidney is normal in size without hydronephrosis. There is a cyst arising from

the inferior pole measuring 5.2 x 4.1 cm.



IMPRESSION:  SMALL GALLSTONES AND TRACE PERICHOLECYSTIC FLUID SUGGESTING THE POSSIBILITY

OF EARLY ACUTE CHOLECYSTITIS. RECOMMEND CLINICAL CORRELATION.

## 2018-04-02 NOTE — RAD
INDICATION: Elevated liver enzymes



COMPARISON: Same day ultrasound of the gallbladder that showed gallstones and trace

pericholecystic fluid.

 

TECHNIQUE: Following the administration of 6.2 millicuries of technetium 99m mebrofenin,

serial, static, anterior images of the abdomen were obtained at 5 minute increments for a

period of one hour. 



FINDINGS: There is prompt uptake of radiopharmaceutical within the liver indicating normal

hepatic function.



Uptake in the gallbladder is seen at 30 minutes. There is filling of the common bile duct

and radiotracer is seen in the duodenum by 55 minutes. Filling of the duodenum and the

proximal jejunum is seen on the 1.25 hour delay images.



IMPRESSION: 

No scintigraphic evidence of acute cholecystitis or cystic duct obstruction.

## 2018-04-02 NOTE — HP
H&P (Free Text)


History and Physical: 





Surgery





Asked by ER MD to evaluate a pt. with abdominal pain and a sono suggestive of 

cholecystitis.  Mr. Yanes is a 77 y.o. male who began to feel abdominal pain 

yesterday morning.  He says it started after a [employer] breakfast which included 

sausage and gravy.  He says he felt bloating and a pressure pain in the low 

chest.  He was concerned that it might be cardiac in origin so he called 911 

and came to the ER.  He denies fever, nausea, vomiting, diarrhea and has had 

some issues with constipation for a couple of weeks.  He denies change in the 

color of the urine or stool.  In the ER he began to feel better.  He had a sono 

which showed slight pericholecystic fluid and stones, there is no ductal 

dilatation.





PMHx:  afib s/p ablation; valve problems; HTN


Meds:  Tamsulosin, finasteride, coumadin, B12, zetia, crestor,potassium, 

procardia, asa, lotensin


ALL:  keflex


ROS:  denies


SH:  neg. tob., neg. EtOH, neg. IVDA


FH:  mother had cancer; brother had crohns and kidney failure





PE:  General:  WDWN male in NAD


 Vital Signs











  04/01/18 04/01/18 04/01/18





  22:14 22:18 22:19


 


Temperature 98.3 F  


 


Pulse Rate 63  67


 


Respiratory 17  23





Rate   


 


Blood Pressure 163/81 163/81 





(mmHg)   


 


O2 Sat by Pulse 99  98





Oximetry   














  04/01/18 04/01/18 04/01/18





  22:30 23:00 23:30


 


Temperature   


 


Pulse Rate 65 62 67


 


Respiratory 17 22 25





Rate   


 


Blood Pressure 162/83 158/83 160/80





(mmHg)   


 


O2 Sat by Pulse 99 98 96





Oximetry   














  04/02/18 04/02/18 04/02/18





  00:00 00:01 00:22


 


Temperature   


 


Pulse Rate 62 60 75


 


Respiratory 20 17 





Rate   


 


Blood Pressure  153/74 





(mmHg)   


 


O2 Sat by Pulse 99 98 96





Oximetry   














  04/02/18 04/02/18 04/02/18





  01:00 01:10 01:12


 


Temperature   


 


Pulse Rate 64 79 


 


Respiratory 20  





Rate   


 


Blood Pressure   164/84





(mmHg)   


 


O2 Sat by Pulse 99 92 





Oximetry   














  04/02/18





  01:30


 


Temperature 


 


Pulse Rate 64


 


Respiratory 18





Rate 


 


Blood Pressure 153/74





(mmHg) 


 


O2 Sat by Pulse 99





Oximetry 











   HEENT:  trace scleral icterus, neg. cervical adenopathy, moist oral mucosa


   lungs:  clear to ausc.


   heart:  reg with loud murmur


   abd:  good BS, soft, non-tender


   ext:  trace edema, neg. cyanosis


 Intake & Output











 04/01/18 04/01/18 04/02/18





 14:59 22:59 06:59


 


Weight  160 lb 








 Laboratory Results - last 24 hr











  04/01/18 04/01/18 04/01/18





  22:35 22:35 22:35


 


WBC  5.7  


 


RBC  3.77 L  


 


Hgb  11.8 L  


 


Hct  35 L  


 


MCV  92  


 


MCH  31  


 


MCHC  34  


 


RDW  14  


 


Plt Count  179  


 


MPV  7.9  


 


Neut % (Auto)  76.9  


 


Lymph % (Auto)  9.9 L  


 


Mono % (Auto)  10.2 H  


 


Eos % (Auto)  2.2  


 


Baso % (Auto)  0.8  


 


Absolute Neuts (auto)  4.4  


 


Absolute Lymphs (auto)  0.6 L  


 


Absolute Monos (auto)  0.6  


 


Absolute Eos (auto)  0.1  


 


Absolute Basos (auto)  0  


 


Absolute Nucleated RBC  0  


 


Nucleated RBC %  0  


 


INR (Anticoag Therapy)   


 


APTT   


 


Sodium   140 


 


Potassium   3.9 


 


Chloride   105 


 


Carbon Dioxide   28 


 


Anion Gap   7 


 


BUN   16 


 


Creatinine   1.16 


 


Est GFR ( Amer)   78.7 


 


Est GFR (Non-Af Amer)   61.2 


 


BUN/Creatinine Ratio   13.8 


 


Glucose   122 H 


 


Lactic Acid    0.7


 


Calcium   8.9 


 


Magnesium   2.1 


 


Total Bilirubin   1.90 H 


 


AST   328 H 


 


ALT   158 H 


 


Alkaline Phosphatase   83 


 


Troponin I   0.02 


 


Total Protein   6.8 


 


Albumin   3.8 


 


Globulin   3.0 


 


Albumin/Globulin Ratio   1.3 














  04/01/18 04/02/18





  22:35 01:30


 


WBC  


 


RBC  


 


Hgb  


 


Hct  


 


MCV  


 


MCH  


 


MCHC  


 


RDW  


 


Plt Count  


 


MPV  


 


Neut % (Auto)  


 


Lymph % (Auto)  


 


Mono % (Auto)  


 


Eos % (Auto)  


 


Baso % (Auto)  


 


Absolute Neuts (auto)  


 


Absolute Lymphs (auto)  


 


Absolute Monos (auto)  


 


Absolute Eos (auto)  


 


Absolute Basos (auto)  


 


Absolute Nucleated RBC  


 


Nucleated RBC %  


 


INR (Anticoag Therapy)  1.93 H 


 


APTT  40.1 H 


 


Sodium  


 


Potassium  


 


Chloride  


 


Carbon Dioxide  


 


Anion Gap  


 


BUN  


 


Creatinine  


 


Est GFR ( Amer)  


 


Est GFR (Non-Af Amer)  


 


BUN/Creatinine Ratio  


 


Glucose  


 


Lactic Acid  


 


Calcium  


 


Magnesium  


 


Total Bilirubin  


 


AST  


 


ALT  


 


Alkaline Phosphatase  


 


Troponin I   0.02


 


Total Protein  


 


Albumin  


 


Globulin  


 


Albumin/Globulin Ratio  











A/P:  Possible early cholecystitis.  Will admit and repeat labs in AM and check 

HIDA scan.  If needs OR, will need to come off coumadin so will hold for now.  

Will ask hospitalist to evaluate and consider cardiology consult if needs to go 

to OR.





CLFoster

## 2018-04-02 NOTE — CONSULT
Subjective


Date of Service: 18


Interval History: 





Date of admission 2018


Date of consult 2018


PMD: Dr. Dueñas


Cardiologist: Dr. Guerrero


CC: abdomen, chest and back pain


Reason for consult: Pre-op cardiac risk stratification for cholecystectomy.





HPI: 


Giancarlo Yanes is a 77 year old man with a past medical history as below.  He 

presented with chest pain and intrascapular pain.  This did not feel like his 

MI pain when he presented with chest pain, nausea and left arm numbness.  He 

ruled out for ACS.  He was found with acute cholecystitis and plans to go to OR 

tomorrow.  He does treadmill for 12 to 15 minutes a day.  He denies any dyspnea

, chest discomfort, edema, palpitations, bleeding or syncope.  He reports that 

EMS initially noted him to be in atrial fibrillation but was in sinus rhythm by 

the time monitor was placed.  I discussed with Jamil Biggs NP from 

hospitalist service and there is though patient passed a gallbladder stone.  He 

is entirely asymptomatic at this time.  





Allergies: Keflex 10/23/13


Cephalexin 10/29/14 - rash, itch, swelling-legs 


allergy list reviewed on 2018





Pmhx


Inferior STEMI s/p KERON (1st generation cypher) to mid and distal RCA with POBA 

of RPL, residual 40% LAD lesion, Lcx no significant disease


Pafib/flutter s/p typical flutter ablation and PVI afib ablation 2017.  

NOAC's were too expensive now takes warfarin


dyslipidemia


HTN


Moderate aortic stenosis





FH:


Father:


No Current Problems - estranged,  when pt young and no contact.


Mother:


Ovarian Cancer - .


Brother 1:


Coronary Artery Disease (CAD) - stents late 60's. 





SH:


Marital: .Lives With: Alone.Occupation: Retired.


Personal Habits:  Smoking: Patient has never smoked.Cigarette Use: Never Smoked 

Cigarettes.Alcohol: Denies alcohol use.Drug Use: Denies Drug Use.Daily Caffeine

: Does Not Consume Caffeine.Exercise Type: Exercises regularly. 





Medications


Active Medications: 








Ezetimibe (Zetia Tab*)  10 mg PO QPM [facility]


Famotidine (Pepcid Iv*)  20 mg IV SLOW PU BID [facility]


   Last Admin: 18 09:07 Dose:  20 mg


Finasteride (Proscar Tab*)  5 mg PO DAILY [facility]


Potassium Chloride/Dextrose (D5w 1/2 Ns Kcl 20 Meq 1000 Ml*)  1,000 mls @ 125 

mls/hr IV PER RATE [facility]


   Last Admin: 18 14:47 Dose:  125 mls/hr


Lisinopril (Prinivil Tab*)  10 mg PO DAILY [facility]


   Last Admin: 18 14:16 Dose:  10 mg


Nifedipine (Procardia Xl Tab*)  30 mg PO DAILY [facility]


   Last Admin: 18 14:16 Dose:  30 mg


Ondansetron HCl (Zofran Inj*)  4 mg IV Q6H PRN


   PRN Reason: NAUSEA


Potassium Chloride (Klor Con Er Tab*)  10 meq PO 1200 [facility]


Tamsulosin HCl (Flomax Cap*)  0.4 mg PO DAILY [facility]





Home Medications: 


 


Aspirin EC TAB* [Ecotrin EC Low Dose 81 MG*] 81 mg PO DAILY 13 [History 

Confirmed 18]


NIFEdipine ER TAB* [Procardia Xl TAB*] 30 mg PO DAILY 13 [History 

Confirmed 18]


Rosuvastatin Calcium [Crestor] 40 mg PO DAILY 13 [History Confirmed ]


Benazepril HCl [Lotensin] 10 mg PO DAILY 17 [History Confirmed 18]


Cyanocobalamin TAB* [Vitamin B12 TAB*] 500 mcg PO DAILY #0 17 [History 

Confirmed 18]


Ezetimibe TAB* [Zetia TAB*] 10 mg PO QPM 17 [History Confirmed 18]


Potassium Chlor TAB* [Klor Con ER TAB 10 MEQ*] 10 meq PO 1200 17 [History 

Confirmed 18]


Warfarin TAB(*) [Coumadin TAB(*)] 1.25 - 2.5 mg PO SEE INSTRUCTIONS 17 [

History Confirmed 18]


Finasteride TAB* 5 mg PO DAILY 18 [History Confirmed 18]


Tamsulosin CAP* 0.4 mg PO DAILY 18 [History Confirmed 18]








Review of Systems





- Measurements


Intake and Output: 


Intake and Output Last 24 Hours











 18





 06:59 06:59 06:59 06:59


 


Intake Total   0 993


 


Output Total   0 0


 


Balance   0 993


 


Weight   162 lb 3.2 oz 


 


Intake:    


 


  IV Fluids    993


 


    D51/2 20k    993


 


  Oral   0 0


 


Output:    


 


  Urine   0 0


 


Other:    


 


  Estimated Void   Medium 


 


  # Voids   1 














- Review of Systems


Constitutional Symptoms: 


   Negative: Weight Gain, Weight Loss, Weakness, Fatigue, Fever


Dermatology: 


   Negative: Rash, Skin Lesions


HEENT: 


   Negative: Change in Hearing, Vertigo


Eyes: 


   Negative: Change in Vision, Double Vision


Thyroid: 


   Negative: Cold Intolerance, Heat Intolerance, Constipation, Palpitations, 

Weight Loss, Weight Gain


Pulmonary: 


   Negative: Cough, Sputum, Hemoptysis, Wheezing, Respiratory Distress, 

Shortness of Breath


Cardiology: 


   Negative: Shortness of Breath, Palpitations, Swelling of Ankles, Peripheral 

Vascular Dis, Edema, Faintness, Syncope


Gastroenterology: 


   Negative: Vomiting, Difficulty Swallowing, Constipation, Diarrhea


Genital - Urinary: 


   Negative: Dysuria, Hematuria, Polyuria, Nocturia, Other


Musculoskeletal: 


   Negative: Joint Pain, Joint Stiffness


Endocrinology: 


   Negative: Thyroid Problems, Hyperglycemia, Calluses, Polydipsia, Polyuria


Hematologic/Lymphatic: Positive: Use of Anticoagulant, Use of Antiplatelet Drugs


Neurology: 


   Negative: Headaches, Migraines, Change in Vision, Diplopia, Dizziness, 

Change in Balancing, Change in Coordination, Change in Memory, Change in Speech

, Change in Sphincter Function


Psychiatry: 


   Negative: Unusual Fatigue, Unusual Anxiety


Allergic/Immunologic: 


   Negative: Hx HIV, Immunocompromise


Review of Systems Statement: 


All other review of systems negative, unless stated above.








Objective


Vital Signs:











Temp Pulse Resp BP Pulse Ox


 


 97.9 F   64   14   137/67   99 


 


 18 07:28  18 07:28  18 07:28  18 07:28  18 07:28











Oxygen Devices in Use Now: None


Appearance: nad, pleasant


Ears/Nose/Mouth/Throat: Clear Oropharnyx, Mucous Membranes Moist


Neck: NL Appearance and Movements; NL JVP, Trachea Midline


Respiratory: Symmetrical Chest Expansion and Respiratory Effort, Clear to 

Auscultation


Cardiovascular: RRR, No Edema, - - 3/6 systolic murmur across precordium 

radiates carotids, a2 present


Abdominal: NL Sounds; No Tenderness; No Distention


Extremities: No Edema


Skin: No Rash or Ulcers


Neurological: Alert and Oriented x 3


Laboratory Results: 


 





 


 





 18 05:16 





 18 05:16 





 











INR (Anticoag Therapy)  1.93  (0.77-1.02)  H  18  22:35    


 


APTT  40.1 seconds (26.0-36.3)  H  18  22:35    


 


Total Bilirubin  2.00 mg/dL (0.2-1.0)  H  18  05:16    


 


AST  321 U/L (13-39)  H  18  05:16    


 


ALT  187 U/L (7-52)  H  18  05:16    


 


Alkaline Phosphatase  112 U/L ()  H  18  05:16    


 


Total Protein  6.4 g/dL (6.4-8.9)   18  05:16    


 


Albumin  3.8 g/dL (3.2-5.2)   18  05:16    


 


Globulin  2.6 g/dL (2-4)   18  05:16    


 


Albumin/Globulin Ratio  1.5  (1-3)   18  05:16    








 











  18





  22:35 01:30 05:16


 


Troponin I  0.02  0.02  0.03











Diagnostic Imaging: 





CXr 2018: enlarged cardiac silhouette unchanged from 2017





T E E - (2017) Moderate AS, no pericardial effusion





Echocardiogram - (2017)  EF 55-60%. moderate AS Small pericardial 

effusion.  





Stress Test - (2017).  Fixed inferior defect





Echocardiogram - (3/7/2017)  Small pericardial effusion, moderate AS, peak 

velcity 3.56 m/s, mean gradient 25 mmHg





 


EKG Data: 





EKG 2018: NSR, 1AVB, old inferior MI, IVCD, appears grossly unchanged from 9

/10/2017 except that EKG was in atrial fibrillation





Assessment/Plan





In summary, Mr. Yanes is a 77-year-old man with a history as above including 

but not limited to MI/PCI 2004 with 1st generation keron/cypher stent, Pafib, 

moderate aortic stenosis who I am asked to evaluate for pre-operative 

cardiovascular risk stratification prior to cholecystectomy.  THere is no 

recent MI, severe valve disease, malignant arrhythmia or decompensated HF.  

Patient has > 4 mets of functional capacity without symptoms.





Patient is not high risk for cardiovascular complications related to proposed 

surgery and can proceed without further cardiac evaluation warranted.  We did 

discuss and patient expressed understanding that there still remains some risk 

of cardiovascular complications related to surgery and anesthesia despite no 

high risk features.  He does accept this risk in favor of the expected benefit 

the surgery is to provide.





Recommendations:


- Patient is not on a beta-blocker at baseline so will not start one this close 

to surgery


- Would hold statin until LFT's normalize and then restart


- The risk of very late stent thrombosis with a first generation KERON is not 

trivial with complete cessation of anti-platelet/anticoagulant.  I would 

restart aspirin ASAP after surgery


- Warfarin can be restart post-operatively without bridge once bleeding risk is 

minimized


- Patient may be predisposed to CHF given his moderate aortic stenosis, would 

carefully use IVF with reassessment daina-operatively 





Thank you for allowing me to participate in the cardiovascular care of this 

patient.  Please do not hesitate to contact me with questions or concerns.

## 2018-04-03 LAB
BASOPHILS # BLD AUTO: 0.1 10^3/UL (ref 0–0.2)
EOSINOPHIL # BLD AUTO: 0.3 10^3/UL (ref 0–0.6)
HCT VFR BLD AUTO: 36 % (ref 42–52)
HGB BLD-MCNC: 12.2 G/DL (ref 14–18)
INR PPP/BLD: 1.93 (ref 0.77–1.02)
LYMPHOCYTES # BLD AUTO: 1.1 10^3/UL (ref 1–4.8)
MCH RBC QN AUTO: 32 PG (ref 27–31)
MCHC RBC AUTO-ENTMCNC: 34 G/DL (ref 31–36)
MCV RBC AUTO: 93 FL (ref 80–94)
MONOCYTES # BLD AUTO: 0.7 10^3/UL (ref 0–0.8)
NEUTROPHILS # BLD AUTO: 4.5 10^3/UL (ref 1.5–7.7)
NRBC # BLD AUTO: 0 10^3/UL
NRBC BLD QL AUTO: 0
PLATELET # BLD AUTO: 192 10^3/UL (ref 150–450)
RBC # BLD AUTO: 3.87 10^6/UL (ref 4–5.4)
WBC # BLD AUTO: 6.7 10^3/UL (ref 3.5–10.8)

## 2018-04-03 RX ADMIN — TAMSULOSIN HYDROCHLORIDE SCH MG: 0.4 CAPSULE ORAL at 10:41

## 2018-04-03 RX ADMIN — FAMOTIDINE SCH MG: 10 INJECTION, SOLUTION INTRAVENOUS at 20:54

## 2018-04-03 RX ADMIN — DEXTROSE MONOHYDRATE, SODIUM CHLORIDE, AND POTASSIUM CHLORIDE SCH MLS/HR: 50; 4.5; 1.49 INJECTION, SOLUTION INTRAVENOUS at 06:41

## 2018-04-03 RX ADMIN — LISINOPRIL SCH: 10 TABLET ORAL at 08:16

## 2018-04-03 RX ADMIN — NIFEDIPINE SCH MG: 30 TABLET, FILM COATED, EXTENDED RELEASE ORAL at 10:41

## 2018-04-03 RX ADMIN — EZETIMIBE SCH MG: 10 TABLET ORAL at 18:18

## 2018-04-03 RX ADMIN — FINASTERIDE SCH: 5 TABLET, FILM COATED ORAL at 08:16

## 2018-04-03 RX ADMIN — FINASTERIDE SCH MG: 5 TABLET, FILM COATED ORAL at 10:40

## 2018-04-03 RX ADMIN — NIFEDIPINE SCH: 30 TABLET, FILM COATED, EXTENDED RELEASE ORAL at 08:16

## 2018-04-03 RX ADMIN — TAMSULOSIN HYDROCHLORIDE SCH: 0.4 CAPSULE ORAL at 08:16

## 2018-04-03 RX ADMIN — POTASSIUM CHLORIDE SCH MEQ: 750 TABLET, FILM COATED, EXTENDED RELEASE ORAL at 10:40

## 2018-04-03 RX ADMIN — LISINOPRIL SCH MG: 10 TABLET ORAL at 10:40

## 2018-04-03 RX ADMIN — FAMOTIDINE SCH MG: 10 INJECTION, SOLUTION INTRAVENOUS at 08:22

## 2018-04-03 NOTE — OP
Operative Report - Blank





- Operative Report


Date of Operation: 04/03/18


Note: 





Surgery Progress:





S: Denies pain, N/V. No other c/o.





 Current Medications





Ezetimibe (Zetia Tab*)  10 mg PO QPM [facility]


   Last Admin: 04/02/18 17:31 Dose:  10 mg


Famotidine (Pepcid Iv*)  20 mg IV SLOW PU BID [facility]


   Last Admin: 04/03/18 08:22 Dose:  20 mg


Finasteride (Proscar Tab*)  5 mg PO DAILY [facility]


   Last Admin: 04/03/18 08:16 Dose:  Not Given


Potassium Chloride/Dextrose (D5w 1/2 Ns Kcl 20 Meq 1000 Ml*)  1,000 mls @ 125 

mls/hr IV PER RATE [facility]


   Last Admin: 04/03/18 06:41 Dose:  125 mls/hr


Lactated Ringer's (Lactated Ringers 1000 Ml Bag*)  1,000 mls @ 125 mls/hr IV 

PER RATE [facility]


Lisinopril (Prinivil Tab*)  10 mg PO DAILY [facility]


   Last Admin: 04/03/18 08:16 Dose:  Not Given


Nifedipine (Procardia Xl Tab*)  30 mg PO DAILY [facility]


   Last Admin: 04/03/18 08:16 Dose:  Not Given


Ondansetron HCl (Zofran Inj*)  4 mg IV Q6H PRN


   PRN Reason: NAUSEA


Potassium Chloride (Klor Con Er Tab*)  10 meq PO 1200 [facility]


Tamsulosin HCl (Flomax Cap*)  0.4 mg PO DAILY [facility]


   Last Admin: 04/03/18 08:16 Dose:  Not Given





O: 


 Vital Signs - 8 hr











  04/03/18 04/03/18





  03:11 07:23


 


Temperature 97.7 F 97.7 F


 


Pulse Rate 55 57


 


Respiratory 16 16





Rate  


 


Blood Pressure 110/60 122/60





(mmHg)  


 


O2 Sat by Pulse 98 98





Oximetry  








Intake and Output Last 24 Hours











 04/01/18 04/02/18 04/03/18 04/04/18





 06:59 06:59 06:59 06:59


 


Intake Total  0 4084 


 


Output Total  0 1800 550


 


Balance  0 2284 -550


 


Weight  162 lb 3.2 oz  


 


Intake:    


 


  IV Fluids   2964 


 


    D51/2 20k   2964 


 


  Oral  0 1120 


 


Output:    


 


  Urine  0 1800 550


 


Other:    


 


  Estimated Void  Medium Large 


 


  # Bowel Movements   0 


 


  # Voids  1  








PE: 


Gen: NAD; appears comfortable


Heart: reg; sys murmur c/w previous


Lungs: clear ant


Abd: flat, nondistended; +BS; soft, nontender to palp





Labs: 





 Laboratory Tests











  04/03/18 04/03/18 04/03/18





  04:57 04:57 04:57


 


WBC    6.7


 


INR (Anticoag Therapy)  1.93 H  


 


Total Bilirubin   1.00 


 


AST   115 H 


 


ALT   113 H 








A: cholecystitis (likely passed a stone), currrently asymptomatic


   INR still 1.9





P: discussed w/ Dr. Wilcox, who would like his INR lower before proceeding w/ 

surgery. Surgery at this point moved to 4/4 afternoon. Will allow clears po; 

recheck INR in a.m. Patient agreeable w/ plan.

## 2018-04-03 NOTE — PN
Subjective


Date of Service: 04/03/18


Interval History: 





Patient has no complaints. No CP, SOB, N/V, Abdominal pain, diarrhea, 

constipation, lower leg swelling, weakness, headache, changes in vision, F/C, 

or other pain. Patient understands the plan for surgery tomorrow pending 

reversal of INR. Patient states that he had issues with urinary retention after 

his ablation late last year and had an indwelling rm for a month afterward. 


Family History: Unchanged from Admission


Social History: Unchanged from Admission


Past Medical History: Unchanged from Admission





Objective


Active Medications: 








Ezetimibe (Zetia Tab*)  10 mg PO QPM [facility]


   Last Admin: 04/02/18 17:31 Dose:  10 mg


Famotidine (Pepcid Iv*)  20 mg IV SLOW PU BID [facility]


   Last Admin: 04/03/18 08:22 Dose:  20 mg


Finasteride (Proscar Tab*)  5 mg PO DAILY [facility]


   Last Admin: 04/03/18 10:40 Dose:  5 mg


Lactated Ringer's (Lactated Ringers 1000 Ml Bag*)  1,000 mls @ 125 mls/hr IV 

PER RATE [facility]


Lisinopril (Prinivil Tab*)  10 mg PO DAILY [facility]


   Last Admin: 04/03/18 10:40 Dose:  10 mg


Nifedipine (Procardia Xl Tab*)  30 mg PO DAILY [facility]


   Last Admin: 04/03/18 10:41 Dose:  30 mg


Ondansetron HCl (Zofran Inj*)  4 mg IV Q6H PRN


   PRN Reason: NAUSEA


Potassium Chloride (Klor Con Er Tab*)  10 meq PO 1200 [facility]


   Last Admin: 04/03/18 10:40 Dose:  10 meq


Tamsulosin HCl (Flomax Cap*)  0.4 mg PO DAILY [facility]


   Last Admin: 04/03/18 10:41 Dose:  0.4 mg








 Vital Signs - 8 hr











  04/03/18 04/03/18 04/03/18





  07:23 08:30 11:09


 


Temperature 97.7 F  97.5 F


 


Pulse Rate 57  55


 


Respiratory 16 16 16





Rate   


 


Blood Pressure 122/60  128/77





(mmHg)   


 


O2 Sat by Pulse 98  98





Oximetry   











Oxygen Devices in Use Now: None


Appearance: Patient is a 78yo female who appears stated age and is sitting in 

the bed in NAD.


Eyes: No Scleral Icterus, PERRLA


Ears/Nose/Mouth/Throat: NL Teeth, Lips, Gums, Clear Oropharnyx, Mucous 

Membranes Moist


Neck: NL Appearance and Movements; NL JVP, Trachea Midline


Respiratory: Symmetrical Chest Expansion and Respiratory Effort, Clear to 

Auscultation


Cardiovascular: RRR, No Edema, - - Grade 3/6 SADIE heard best at RUSB. 


Abdominal: NL Sounds; No Tenderness; No Distention, No Hepatosplenomegaly, - - 

Negative Swenson's sign. 


Lymphatic: No Cervical Adenopathy


Extremities: No Edema, No Clubbing, Cyanosis


Skin: No Rash or Ulcers, No Nodules or Sclerosis


Neurological: Alert and Oriented x 3, NL Sensation, NL Muscle Strength and Tone

, - - CN II-XII intact.


Result Diagrams: 


 04/03/18 04:57





 04/03/18 04:57


Additional Lab and Data: 


 Lab Results

















Assess/Plan/Problems-Billing


Assessment: 





Patient is a 78yo male with a PMH for Afib, Rheumatic fever, CAD with MI, HTN, 

HLD who presents with probable choledocholithiasis or cholecystitis with 

passage of the obstruction who is currently asymptomatic and pending surgery 

tentatively on 4/4. 





- Patient Problems


(1) Choledocholithiasis


Current Visit: Yes   Status: Acute   Code(s): K80.50 - CALCULUS OF BILE DUCT W/

O CHOLANGITIS OR CHOLECYST W/O OBST   SNOMED Code(s): 235382365


   Comment: 


Managment per surgery.


Negative HIDA. GB U/S with pericystocholic fluid. Increased LFTs, trending 

down. Planning for surgery on 4/4.    





(2) CAD (coronary artery disease)


Current Visit: Yes   Status: Acute   Code(s): I25.10 - ATHSCL HEART DISEASE OF 

NATIVE CORONARY ARTERY W/O ANG PCTRS   SNOMED Code(s): 28717413


   Comment: 


History of MI. No Current Chest pain. No ischemic EKG changes. No elevated 

troponin. Appreciate Cardiology consult. Medically optimized for surgery.    





(3) Aortic stenosis


Current Visit: Yes   Status: Acute   Code(s): I35.0 - NONRHEUMATIC AORTIC (VALVE

) STENOSIS   SNOMED Code(s): 03321292


   Comment: 


Grade 3/6 systolic ejection murmur. Followed by Dr. Guerrero outpatient. Likely 

due to previous rheumatic fever.    





(4) Atrial fibrillation


Current Visit: Yes   Status: Acute   Code(s): I48.91 - UNSPECIFIED ATRIAL 

FIBRILLATION   SNOMED Code(s): 17230547


   Comment: 


In NSR now. S/P ablation. Coumadin held and INR trending down.    





(5) HTN (hypertension)


Current Visit: Yes   Status: Acute   Code(s): I10 - ESSENTIAL (PRIMARY) 

HYPERTENSION   SNOMED Code(s): 26767225


   Comment: 


Normotensive. Hold lisinopril on day of surgery.   





(6) HLD (hyperlipidemia)


Current Visit: Yes   Status: Acute   Code(s): E78.5 - HYPERLIPIDEMIA, 

UNSPECIFIED   SNOMED Code(s): 67395042


   Comment: 


Continue Zetia, Hold Lipitor and resume on renormalization of LFTs.   





(7) Full code status


Current Visit: Yes   Status: Acute   Code(s): Z78.9 - OTHER SPECIFIED HEALTH 

STATUS   SNOMED Code(s): 166030938


   





(8) DVT prophylaxis


Current Visit: Yes   Status: Acute   Code(s): VFO3151 -    SNOMED Code(s): 

693500344


   Comment: 


INR elevated. SCDs. Resume Coumadin post-op without bridging.    


Status and Disposition: 





Inpatient for surgery.

## 2018-04-04 VITALS — DIASTOLIC BLOOD PRESSURE: 58 MMHG | SYSTOLIC BLOOD PRESSURE: 109 MMHG

## 2018-04-04 LAB — INR PPP/BLD: 1.89 (ref 0.77–1.02)

## 2018-04-04 RX ADMIN — TAMSULOSIN HYDROCHLORIDE SCH MG: 0.4 CAPSULE ORAL at 09:35

## 2018-04-04 RX ADMIN — FAMOTIDINE SCH MG: 10 INJECTION, SOLUTION INTRAVENOUS at 09:35

## 2018-04-04 RX ADMIN — FINASTERIDE SCH MG: 5 TABLET, FILM COATED ORAL at 10:08

## 2018-04-04 RX ADMIN — POTASSIUM CHLORIDE SCH MEQ: 750 TABLET, FILM COATED, EXTENDED RELEASE ORAL at 12:13

## 2018-04-05 NOTE — DS
CC:  Dr. Nelly Dueñas *

 

DISCHARGE SUMMARY:

 

DATE OF ADMISSION:  04/01/18

 

DATE OF DISCHARGE:  04/04/18

 

ATTENDING SURGEONS:  Dr. Vanessa Lemons and Dr. Marcel Wilcxo.



ATTENDING PROVIDER:  Vanessa Lemons MD * (DICTATED BY BOYD DALEY)

 

HOSPITAL COURSE:  Please refer to admission history and physical.  The patient 
was admitted with symptoms and ultrasound suggestive of cholecystitis along 
with cholelithiasis.  His liver function tests were elevated (total bilirubin 
1.9, , ).  He underwent HIDA scan, which showed uptake in the 
gallbladder as well as filling of the common bile duct and duodenum indicating 
no obstruction. His ultrasound did show small gallstones and trace 
pericholecystic fluid, but without gallbladder wall thickening or ductal 
dilatation.  He improved clinically over the next 24 hours and liver function 
tests have improved.  However, his INR remained elevated at 1.93 on admission 
with no change on 04/03/18 and decreased to 1.89 on 04/04/18.  As he was 
asymptomatic, it was discussed and felt appropriate that he be discharged with 
plan to return on Friday 04/06/18 for laparoscopic cholecystectomy.  He will 
continue to hold his warfarin, but resume his other usual p.o. medications.  He 
is in agreement with the plan and understands to contact our office and/or 
return to the hospital if he has recurrent symptoms.  We will obtain an INR 
upon admission on 04/06/18.  It was discussed and decided not to administer 
vitamin K.

 

____________________________________ BOYD DALEY

 

[id number]/[facility] #: [id number]

[facility]